# Patient Record
Sex: FEMALE | Race: WHITE | NOT HISPANIC OR LATINO
[De-identification: names, ages, dates, MRNs, and addresses within clinical notes are randomized per-mention and may not be internally consistent; named-entity substitution may affect disease eponyms.]

---

## 2019-01-15 ENCOUNTER — APPOINTMENT (OUTPATIENT)
Dept: INTERNAL MEDICINE | Facility: CLINIC | Age: 49
End: 2019-01-15
Payer: COMMERCIAL

## 2019-01-15 VITALS
BODY MASS INDEX: 36.29 KG/M2 | HEIGHT: 63.75 IN | WEIGHT: 210 LBS | SYSTOLIC BLOOD PRESSURE: 150 MMHG | DIASTOLIC BLOOD PRESSURE: 90 MMHG

## 2019-01-15 DIAGNOSIS — R63.5 ABNORMAL WEIGHT GAIN: ICD-10-CM

## 2019-01-15 DIAGNOSIS — Z78.9 OTHER SPECIFIED HEALTH STATUS: ICD-10-CM

## 2019-01-15 DIAGNOSIS — Z80.0 FAMILY HISTORY OF MALIGNANT NEOPLASM OF DIGESTIVE ORGANS: ICD-10-CM

## 2019-01-15 DIAGNOSIS — Z82.49 FAMILY HISTORY OF ISCHEMIC HEART DISEASE AND OTHER DISEASES OF THE CIRCULATORY SYSTEM: ICD-10-CM

## 2019-01-15 DIAGNOSIS — Z83.3 FAMILY HISTORY OF DIABETES MELLITUS: ICD-10-CM

## 2019-01-15 PROCEDURE — 99214 OFFICE O/P EST MOD 30 MIN: CPT

## 2019-01-15 RX ORDER — ESCITALOPRAM OXALATE 10 MG/1
10 TABLET, FILM COATED ORAL
Refills: 0 | Status: DISCONTINUED | COMMUNITY
End: 2019-01-15

## 2019-01-15 NOTE — HISTORY OF PRESENT ILLNESS
[de-identified] : Patient is a 48 F with HTN (was diet controlled), anxiety, HLD presents today for elevated blood pressure. Has been having fatigue, headaches for a few months. High readings with her own machine, which was in 140s. Her niece who is an EMT  took it at 159/95\par reports weight gain (was 198lbs in 4/2018, now 210lbs) and dietary indiscretions - using more salt. Cooks, rarely goes out for meals.

## 2019-01-15 NOTE — REVIEW OF SYSTEMS
[Fatigue] : fatigue [Recent Change In Weight] : ~T recent weight change [Negative] : Heme/Lymph [Headache] : headache [Dizziness] : no dizziness [Memory Loss] : no memory loss [Unsteady Walking] : no ataxia

## 2019-01-15 NOTE — PHYSICAL EXAM
[No Acute Distress] : no acute distress [Well Nourished] : well nourished [Well Developed] : well developed [Well-Appearing] : well-appearing [Normal Sclera/Conjunctiva] : normal sclera/conjunctiva [PERRL] : pupils equal round and reactive to light [EOMI] : extraocular movements intact [Normal Outer Ear/Nose] : the outer ears and nose were normal in appearance [Normal Oropharynx] : the oropharynx was normal [Supple] : supple [No Lymphadenopathy] : no lymphadenopathy [Thyroid Normal, No Nodules] : the thyroid was normal and there were no nodules present [No Respiratory Distress] : no respiratory distress  [Clear to Auscultation] : lungs were clear to auscultation bilaterally [No Accessory Muscle Use] : no accessory muscle use [Normal Rate] : normal rate  [Regular Rhythm] : with a regular rhythm [Normal S1, S2] : normal S1 and S2 [No Murmur] : no murmur heard [No Carotid Bruits] : no carotid bruits [Pedal Pulses Present] : the pedal pulses are present [No Edema] : there was no peripheral edema [No Extremity Clubbing/Cyanosis] : no extremity clubbing/cyanosis [Soft] : abdomen soft [Non Tender] : non-tender [Non-distended] : non-distended [No Masses] : no abdominal mass palpated [No HSM] : no HSM [Normal Bowel Sounds] : normal bowel sounds [Normal Posterior Cervical Nodes] : no posterior cervical lymphadenopathy [Normal Anterior Cervical Nodes] : no anterior cervical lymphadenopathy [No CVA Tenderness] : no CVA  tenderness [No Spinal Tenderness] : no spinal tenderness [No Joint Swelling] : no joint swelling [Grossly Normal Strength/Tone] : grossly normal strength/tone [No Rash] : no rash [Normal Gait] : normal gait [Coordination Grossly Intact] : coordination grossly intact [No Focal Deficits] : no focal deficits [Normal Affect] : the affect was normal [Normal Insight/Judgement] : insight and judgment were intact

## 2019-01-16 ENCOUNTER — TRANSCRIPTION ENCOUNTER (OUTPATIENT)
Age: 49
End: 2019-01-16

## 2019-02-04 ENCOUNTER — APPOINTMENT (OUTPATIENT)
Dept: INTERNAL MEDICINE | Facility: CLINIC | Age: 49
End: 2019-02-04
Payer: COMMERCIAL

## 2019-02-04 ENCOUNTER — RESULT CHARGE (OUTPATIENT)
Age: 49
End: 2019-02-04

## 2019-02-04 VITALS — SYSTOLIC BLOOD PRESSURE: 130 MMHG | DIASTOLIC BLOOD PRESSURE: 80 MMHG

## 2019-02-04 VITALS
SYSTOLIC BLOOD PRESSURE: 140 MMHG | DIASTOLIC BLOOD PRESSURE: 80 MMHG | WEIGHT: 210 LBS | HEIGHT: 63.75 IN | TEMPERATURE: 97 F | BODY MASS INDEX: 36.29 KG/M2

## 2019-02-04 DIAGNOSIS — R30.0 DYSURIA: ICD-10-CM

## 2019-02-04 PROCEDURE — 99214 OFFICE O/P EST MOD 30 MIN: CPT | Mod: 25

## 2019-02-04 PROCEDURE — 81003 URINALYSIS AUTO W/O SCOPE: CPT | Mod: QW

## 2019-02-04 NOTE — HISTORY OF PRESENT ILLNESS
[FreeTextEntry8] : Patient is a 48F with HTN, presents today with dysuria and frequency since yesterday. There is burning on urination at the end of the stream and darkening of the urine. Symptoms continue today with lower abdominal pain as well. \par There is no fever or chills\par \par Patient with newly diagnosed HTN on Amlodipine since 1/15/19. tolerating medication well. Taking BP at home in 130s and 140s.

## 2019-02-06 LAB — BACTERIA UR CULT: NORMAL

## 2019-02-07 ENCOUNTER — RECORD ABSTRACTING (OUTPATIENT)
Age: 49
End: 2019-02-07

## 2019-02-07 DIAGNOSIS — F41.9 ANXIETY DISORDER, UNSPECIFIED: ICD-10-CM

## 2019-02-07 DIAGNOSIS — Z87.2 PERSONAL HISTORY OF DISEASES OF THE SKIN AND SUBCUTANEOUS TISSUE: ICD-10-CM

## 2019-02-07 DIAGNOSIS — Z87.09 PERSONAL HISTORY OF OTHER DISEASES OF THE RESPIRATORY SYSTEM: ICD-10-CM

## 2019-02-07 DIAGNOSIS — N76.0 ACUTE VAGINITIS: ICD-10-CM

## 2019-02-07 DIAGNOSIS — Z87.891 PERSONAL HISTORY OF NICOTINE DEPENDENCE: ICD-10-CM

## 2019-02-07 DIAGNOSIS — N80.9 ENDOMETRIOSIS, UNSPECIFIED: ICD-10-CM

## 2019-02-07 LAB — CYTOLOGY CVX/VAG DOC THIN PREP: NORMAL

## 2019-02-11 ENCOUNTER — APPOINTMENT (OUTPATIENT)
Dept: INTERNAL MEDICINE | Facility: CLINIC | Age: 49
End: 2019-02-11

## 2019-02-13 ENCOUNTER — RESULT CHARGE (OUTPATIENT)
Age: 49
End: 2019-02-13

## 2019-02-13 ENCOUNTER — OTHER (OUTPATIENT)
Age: 49
End: 2019-02-13

## 2019-02-15 LAB
BILIRUB UR QL STRIP: NORMAL
CLARITY UR: CLEAR
COLLECTION METHOD: NORMAL
GLUCOSE UR-MCNC: NORMAL
HCG UR QL: 0.2 EU/DL
HGB UR QL STRIP.AUTO: NORMAL
KETONES UR-MCNC: NORMAL
LEUKOCYTE ESTERASE UR QL STRIP: NORMAL
NITRITE UR QL STRIP: NEGATIVE
PH UR STRIP: 6
PROT UR STRIP-MCNC: 100
SP GR UR STRIP: 1.02

## 2019-02-16 ENCOUNTER — OTHER (OUTPATIENT)
Age: 49
End: 2019-02-16

## 2019-02-16 LAB — BACTERIA UR CULT: ABNORMAL

## 2019-03-01 ENCOUNTER — RX RENEWAL (OUTPATIENT)
Age: 49
End: 2019-03-01

## 2019-03-15 ENCOUNTER — RX RENEWAL (OUTPATIENT)
Age: 49
End: 2019-03-15

## 2019-03-19 ENCOUNTER — RX RENEWAL (OUTPATIENT)
Age: 49
End: 2019-03-19

## 2019-03-22 ENCOUNTER — APPOINTMENT (OUTPATIENT)
Dept: INTERNAL MEDICINE | Facility: CLINIC | Age: 49
End: 2019-03-22
Payer: COMMERCIAL

## 2019-03-22 VITALS
HEIGHT: 63.75 IN | DIASTOLIC BLOOD PRESSURE: 76 MMHG | BODY MASS INDEX: 36.29 KG/M2 | OXYGEN SATURATION: 98 % | WEIGHT: 210 LBS | HEART RATE: 85 BPM | TEMPERATURE: 98.7 F | SYSTOLIC BLOOD PRESSURE: 121 MMHG

## 2019-03-22 DIAGNOSIS — Z87.09 PERSONAL HISTORY OF OTHER DISEASES OF THE RESPIRATORY SYSTEM: ICD-10-CM

## 2019-03-22 PROCEDURE — 99213 OFFICE O/P EST LOW 20 MIN: CPT | Mod: 25

## 2019-03-22 PROCEDURE — G0444 DEPRESSION SCREEN ANNUAL: CPT

## 2019-03-22 PROCEDURE — G0442 ANNUAL ALCOHOL SCREEN 15 MIN: CPT

## 2019-03-22 RX ORDER — NITROFURANTOIN (MONOHYDRATE/MACROCRYSTALS) 25; 75 MG/1; MG/1
100 CAPSULE ORAL
Qty: 10 | Refills: 0 | Status: COMPLETED | COMMUNITY
Start: 2019-02-04 | End: 2019-03-22

## 2019-03-22 RX ORDER — SULFAMETHOXAZOLE AND TRIMETHOPRIM 800; 160 MG/1; MG/1
800-160 TABLET ORAL TWICE DAILY
Qty: 6 | Refills: 0 | Status: COMPLETED | COMMUNITY
Start: 2019-02-16 | End: 2019-03-22

## 2019-03-22 RX ORDER — METRONIDAZOLE 7.5 MG/G
0.75 GEL VAGINAL
Refills: 0 | Status: COMPLETED | COMMUNITY
End: 2019-03-22

## 2019-03-22 RX ORDER — UBIDECARENONE 30 MG
CAPSULE ORAL
Refills: 0 | Status: ACTIVE | COMMUNITY

## 2019-03-22 NOTE — PHYSICAL EXAM

## 2019-03-22 NOTE — HISTORY OF PRESENT ILLNESS
[FreeTextEntry8] : sore throat  severe.  achey all over.   feeling very fatigued.     no cough.   no nasal congestion.  \par \par

## 2019-03-25 LAB — BACTERIA THROAT CULT: NORMAL

## 2019-04-03 ENCOUNTER — OTHER (OUTPATIENT)
Age: 49
End: 2019-04-03

## 2019-04-10 ENCOUNTER — RX RENEWAL (OUTPATIENT)
Age: 49
End: 2019-04-10

## 2019-05-14 ENCOUNTER — APPOINTMENT (OUTPATIENT)
Dept: OBGYN | Facility: CLINIC | Age: 49
End: 2019-05-14
Payer: COMMERCIAL

## 2019-05-14 VITALS
DIASTOLIC BLOOD PRESSURE: 78 MMHG | HEIGHT: 63 IN | BODY MASS INDEX: 37.21 KG/M2 | SYSTOLIC BLOOD PRESSURE: 122 MMHG | WEIGHT: 210 LBS

## 2019-05-14 DIAGNOSIS — Z12.31 ENCOUNTER FOR SCREENING MAMMOGRAM FOR MALIGNANT NEOPLASM OF BREAST: ICD-10-CM

## 2019-05-14 DIAGNOSIS — R92.2 INCONCLUSIVE MAMMOGRAM: ICD-10-CM

## 2019-05-14 PROCEDURE — 99396 PREV VISIT EST AGE 40-64: CPT

## 2019-05-16 NOTE — HISTORY OF PRESENT ILLNESS
[Post-Menopause, No Sxs] : post-menopausal, currently without symptoms [Sexually Active] : is not sexually active

## 2019-06-03 ENCOUNTER — RX RENEWAL (OUTPATIENT)
Age: 49
End: 2019-06-03

## 2019-06-03 ENCOUNTER — TRANSCRIPTION ENCOUNTER (OUTPATIENT)
Age: 49
End: 2019-06-03

## 2019-06-13 ENCOUNTER — RX RENEWAL (OUTPATIENT)
Age: 49
End: 2019-06-13

## 2019-06-17 LAB
CANDIDA VAG CYTO: NOT DETECTED
CYTOLOGY CVX/VAG DOC THIN PREP: NORMAL
G VAGINALIS+PREV SP MTYP VAG QL MICRO: NOT DETECTED
HPV HIGH+LOW RISK DNA PNL CVX: NOT DETECTED
T VAGINALIS VAG QL WET PREP: NOT DETECTED

## 2019-07-03 ENCOUNTER — RX RENEWAL (OUTPATIENT)
Age: 49
End: 2019-07-03

## 2019-07-29 ENCOUNTER — RESULT REVIEW (OUTPATIENT)
Age: 49
End: 2019-07-29

## 2019-07-31 ENCOUNTER — RX RENEWAL (OUTPATIENT)
Age: 49
End: 2019-07-31

## 2020-02-10 ENCOUNTER — APPOINTMENT (OUTPATIENT)
Dept: INTERNAL MEDICINE | Facility: CLINIC | Age: 50
End: 2020-02-10
Payer: COMMERCIAL

## 2020-02-10 VITALS — HEIGHT: 63 IN | TEMPERATURE: 98 F | BODY MASS INDEX: 37.21 KG/M2 | WEIGHT: 210 LBS

## 2020-02-10 VITALS — DIASTOLIC BLOOD PRESSURE: 90 MMHG | SYSTOLIC BLOOD PRESSURE: 150 MMHG

## 2020-02-10 DIAGNOSIS — Z87.09 PERSONAL HISTORY OF OTHER DISEASES OF THE RESPIRATORY SYSTEM: ICD-10-CM

## 2020-02-10 PROCEDURE — 99213 OFFICE O/P EST LOW 20 MIN: CPT

## 2020-02-10 NOTE — HEALTH RISK ASSESSMENT
[Yes] : Yes [Never (0 pts)] : Never (0 points) [Monthly or less (1 pt)] : Monthly or less (1 point) [1 or 2 (0 pts)] : 1 or 2 (0 points) [No] : In the past 12 months have you used drugs other than those required for medical reasons? No [No falls in past year] : Patient reported no falls in the past year [0] : 2) Feeling down, depressed, or hopeless: Not at all (0) [] : No [YearQuit] : 1999 [de-identified] : rare [de-identified] : No [de-identified] : healthy [ICB1Pkxxx] : 0

## 2020-02-10 NOTE — HISTORY OF PRESENT ILLNESS
[FreeTextEntry8] : Patient is a 49F with HTN presents today feeling sick\par Has been sick on and off since november, in between had UTI. Was flu swabbed at beginning of Jan which was negative\par Has now developed a cough for the last few days. No fever currently

## 2020-02-10 NOTE — PHYSICAL EXAM
[Normal Sclera/Conjunctiva] : normal sclera/conjunctiva [Normal Outer Ear/Nose] : the outer ears and nose were normal in appearance [Normal] : no respiratory distress, lungs were clear to auscultation bilaterally and no accessory muscle use [No Edema] : there was no peripheral edema [de-identified] : swollen nasal turbinates, sinus tenderness

## 2020-02-10 NOTE — REVIEW OF SYSTEMS
[Fatigue] : fatigue [Postnasal Drip] : postnasal drip [Cough] : cough [Chills] : no chills [Fever] : no fever [Nasal Discharge] : no nasal discharge [Sore Throat] : no sore throat

## 2020-05-13 ENCOUNTER — APPOINTMENT (OUTPATIENT)
Dept: INTERNAL MEDICINE | Facility: CLINIC | Age: 50
End: 2020-05-13
Payer: COMMERCIAL

## 2020-05-13 VITALS — WEIGHT: 222 LBS | TEMPERATURE: 98 F | BODY MASS INDEX: 39.34 KG/M2 | HEIGHT: 63 IN

## 2020-05-13 VITALS — SYSTOLIC BLOOD PRESSURE: 140 MMHG | DIASTOLIC BLOOD PRESSURE: 80 MMHG

## 2020-05-13 DIAGNOSIS — R51 HEADACHE: ICD-10-CM

## 2020-05-13 PROCEDURE — 99213 OFFICE O/P EST LOW 20 MIN: CPT

## 2020-05-13 RX ORDER — DOXYCYCLINE 100 MG/1
100 TABLET, FILM COATED ORAL
Qty: 14 | Refills: 0 | Status: COMPLETED | COMMUNITY
Start: 2020-02-10 | End: 2020-05-13

## 2020-05-13 RX ORDER — AZITHROMYCIN 250 MG/1
250 TABLET, FILM COATED ORAL
Qty: 6 | Refills: 0 | Status: COMPLETED | COMMUNITY
Start: 2019-03-22 | End: 2020-05-13

## 2020-05-13 NOTE — HEALTH RISK ASSESSMENT
[Yes] : Yes [2 - 4 times a month (2 pts)] : 2-4 times a month (2 points) [1 or 2 (0 pts)] : 1 or 2 (0 points) [Never (0 pts)] : Never (0 points) [No] : In the past 12 months have you used drugs other than those required for medical reasons? No [No falls in past year] : Patient reported no falls in the past year [0] : 2) Feeling down, depressed, or hopeless: Not at all (0) [] : No [YearQuit] : 1998 [de-identified] : working on it- quarantine diet  [de-identified] : no [HXF1Ubfon] : 0

## 2020-05-13 NOTE — HISTORY OF PRESENT ILLNESS
[FreeTextEntry8] : Patient is a 49F with HTN presents today with headaches and elevated BP readings at home. \par Has been having a headache x 1 week, minimal relief with tylenol and aleve\par BP elevated at home systolic 140s\par Takes amlodipine 7.5mg daily. Was previously on HCTZ but d/c'd since lost weight\par Has gained 12 lbs since last visit

## 2020-05-21 ENCOUNTER — RX RENEWAL (OUTPATIENT)
Age: 50
End: 2020-05-21

## 2020-06-09 ENCOUNTER — APPOINTMENT (OUTPATIENT)
Dept: INTERNAL MEDICINE | Facility: CLINIC | Age: 50
End: 2020-06-09
Payer: COMMERCIAL

## 2020-06-09 VITALS — DIASTOLIC BLOOD PRESSURE: 70 MMHG | SYSTOLIC BLOOD PRESSURE: 118 MMHG

## 2020-06-09 VITALS — TEMPERATURE: 98 F | WEIGHT: 222 LBS | BODY MASS INDEX: 39.34 KG/M2 | HEIGHT: 63 IN

## 2020-06-09 PROCEDURE — G0009: CPT

## 2020-06-09 PROCEDURE — 36415 COLL VENOUS BLD VENIPUNCTURE: CPT

## 2020-06-09 PROCEDURE — 90732 PPSV23 VACC 2 YRS+ SUBQ/IM: CPT

## 2020-06-09 PROCEDURE — 99396 PREV VISIT EST AGE 40-64: CPT | Mod: 25

## 2020-06-09 NOTE — HISTORY OF PRESENT ILLNESS
[FreeTextEntry1] : physical [de-identified] : Patient is a 48 F with HTN (was diet controlled), anxiety, HLD presents today for annual physical\par Since adding HCTZ 25mg Headaches resolved and BP improved

## 2020-06-11 ENCOUNTER — TRANSCRIPTION ENCOUNTER (OUTPATIENT)
Age: 50
End: 2020-06-11

## 2020-06-11 LAB
ALBUMIN SERPL ELPH-MCNC: 4.2 G/DL
ALP BLD-CCNC: 93 U/L
ALT SERPL-CCNC: 30 U/L
ANION GAP SERPL CALC-SCNC: 15 MMOL/L
AST SERPL-CCNC: 31 U/L
BASOPHILS # BLD AUTO: 0.06 K/UL
BASOPHILS NFR BLD AUTO: 0.4 %
BILIRUB SERPL-MCNC: 0.2 MG/DL
BUN SERPL-MCNC: 16 MG/DL
CALCIUM SERPL-MCNC: 9.4 MG/DL
CHLORIDE SERPL-SCNC: 101 MMOL/L
CHOLEST SERPL-MCNC: 221 MG/DL
CHOLEST/HDLC SERPL: 4.4 RATIO
CO2 SERPL-SCNC: 26 MMOL/L
CREAT SERPL-MCNC: 0.58 MG/DL
EOSINOPHIL # BLD AUTO: 0.3 K/UL
EOSINOPHIL NFR BLD AUTO: 2.2 %
ESTIMATED AVERAGE GLUCOSE: 111 MG/DL
FERRITIN SERPL-MCNC: 329 NG/ML
FOLATE SERPL-MCNC: 8.4 NG/ML
GLUCOSE SERPL-MCNC: 88 MG/DL
HBA1C MFR BLD HPLC: 5.5 %
HCT VFR BLD CALC: 44.1 %
HDLC SERPL-MCNC: 51 MG/DL
HGB BLD-MCNC: 13.2 G/DL
IMM GRANULOCYTES NFR BLD AUTO: 0.7 %
IRON SATN MFR SERPL: 28 %
IRON SERPL-MCNC: 107 UG/DL
LDLC SERPL CALC-MCNC: 103 MG/DL
LYMPHOCYTES # BLD AUTO: 2.83 K/UL
LYMPHOCYTES NFR BLD AUTO: 21 %
MAN DIFF?: NORMAL
MCHC RBC-ENTMCNC: 27.8 PG
MCHC RBC-ENTMCNC: 29.9 GM/DL
MCV RBC AUTO: 93 FL
MONOCYTES # BLD AUTO: 0.64 K/UL
MONOCYTES NFR BLD AUTO: 4.8 %
NEUTROPHILS # BLD AUTO: 9.55 K/UL
NEUTROPHILS NFR BLD AUTO: 70.9 %
PLATELET # BLD AUTO: 331 K/UL
POTASSIUM SERPL-SCNC: 3.9 MMOL/L
PROT SERPL-MCNC: 7.3 G/DL
RBC # BLD: 4.74 M/UL
RBC # FLD: 14.6 %
SODIUM SERPL-SCNC: 142 MMOL/L
TIBC SERPL-MCNC: 383 UG/DL
TRIGL SERPL-MCNC: 338 MG/DL
TSH SERPL-ACNC: 1.29 UIU/ML
UIBC SERPL-MCNC: 276 UG/DL
VIT B12 SERPL-MCNC: 468 PG/ML
WBC # FLD AUTO: 13.47 K/UL

## 2020-07-16 ENCOUNTER — RX RENEWAL (OUTPATIENT)
Age: 50
End: 2020-07-16

## 2020-09-22 ENCOUNTER — RESULT REVIEW (OUTPATIENT)
Age: 50
End: 2020-09-22

## 2020-10-09 ENCOUNTER — APPOINTMENT (OUTPATIENT)
Dept: OBGYN | Facility: CLINIC | Age: 50
End: 2020-10-09
Payer: COMMERCIAL

## 2020-10-09 DIAGNOSIS — Z01.419 ENCOUNTER FOR GYNECOLOGICAL EXAMINATION (GENERAL) (ROUTINE) W/OUT ABNORMAL FINDINGS: ICD-10-CM

## 2020-10-09 DIAGNOSIS — Z11.51 ENCOUNTER FOR SCREENING FOR HUMAN PAPILLOMAVIRUS (HPV): ICD-10-CM

## 2020-10-09 PROCEDURE — 99396 PREV VISIT EST AGE 40-64: CPT

## 2020-10-13 LAB — HPV HIGH+LOW RISK DNA PNL CVX: NOT DETECTED

## 2020-10-14 PROBLEM — Z11.51 SCREENING FOR HPV (HUMAN PAPILLOMAVIRUS): Status: RESOLVED | Noted: 2019-05-14 | Resolved: 2020-10-14

## 2020-10-14 PROBLEM — Z01.419 ENCOUNTER FOR ANNUAL ROUTINE GYNECOLOGICAL EXAMINATION: Status: RESOLVED | Noted: 2019-05-14 | Resolved: 2020-10-14

## 2020-10-14 NOTE — HISTORY OF PRESENT ILLNESS
[FreeTextEntry1] : 50 y o female presents for routine annual GYN care. Her last annual GYN visit was in 5/2019 and pap smear then was benign HPV negative. \par She states she is well and overall in good health. She denies current GYN complaints and reports normal bowel and bladder function. She is sexually active in a stable monogamous relationship and has no concerns. She reports monthly menses which are normal in flow and duration. \par Breast imaging in 9/2020 was benign BI-RADS 1\par Colonoscopy in 2017 was normal

## 2020-10-19 LAB — CYTOLOGY CVX/VAG DOC THIN PREP: ABNORMAL

## 2020-12-21 PROBLEM — Z87.09 HISTORY OF ACUTE PHARYNGITIS: Status: RESOLVED | Noted: 2019-03-22 | Resolved: 2020-12-21

## 2020-12-21 PROBLEM — Z12.31 ENCOUNTER FOR SCREENING MAMMOGRAM FOR BREAST CANCER: Status: RESOLVED | Noted: 2019-05-14 | Resolved: 2020-12-21

## 2020-12-21 PROBLEM — N76.0 ACUTE VAGINITIS: Status: RESOLVED | Noted: 2019-02-07 | Resolved: 2020-12-21

## 2020-12-23 PROBLEM — Z87.09 HISTORY OF ACUTE SINUSITIS: Status: RESOLVED | Noted: 2020-02-10 | Resolved: 2020-12-23

## 2021-01-10 ENCOUNTER — RX RENEWAL (OUTPATIENT)
Age: 51
End: 2021-01-10

## 2021-07-26 ENCOUNTER — RX RENEWAL (OUTPATIENT)
Age: 51
End: 2021-07-26

## 2021-10-11 ENCOUNTER — APPOINTMENT (OUTPATIENT)
Dept: OBGYN | Facility: CLINIC | Age: 51
End: 2021-10-11
Payer: COMMERCIAL

## 2021-10-11 VITALS
DIASTOLIC BLOOD PRESSURE: 80 MMHG | BODY MASS INDEX: 38.09 KG/M2 | SYSTOLIC BLOOD PRESSURE: 142 MMHG | WEIGHT: 215 LBS | HEIGHT: 63 IN

## 2021-10-11 PROCEDURE — 99396 PREV VISIT EST AGE 40-64: CPT

## 2021-10-11 NOTE — HISTORY OF PRESENT ILLNESS
[TextBox_4] : 50 year old pt for annual exam. No complaints today. Using continuous OCPs for control of endometriosis and satisfied with method. Not currently sexually active; in the midst of a divorce. No history of cervical dysplasia. Up to date with colon ca screening, due for mammogram. Works for the [employer].

## 2021-10-11 NOTE — PLAN
[FreeTextEntry1] : OCPs renewed, rx mammogram given. Cervix ca screening due 2025. Return in one year or sooner PRN.

## 2021-10-11 NOTE — PHYSICAL EXAM
[Appropriately responsive] : appropriately responsive [Alert] : alert [No Acute Distress] : no acute distress [No Lymphadenopathy] : no lymphadenopathy [Soft] : soft [Non-tender] : non-tender [Non-distended] : non-distended [No HSM] : No HSM [No Lesions] : no lesions [Oriented x3] : oriented x3 [No Mass] : no mass [Examination Of The Breasts] : a normal appearance [No Masses] : no breast masses were palpable [Labia Majora] : normal [Labia Minora] : normal [Normal] : normal [Uterine Adnexae] : normal

## 2021-11-15 ENCOUNTER — NON-APPOINTMENT (OUTPATIENT)
Age: 51
End: 2021-11-15

## 2021-11-17 ENCOUNTER — APPOINTMENT (OUTPATIENT)
Dept: INTERNAL MEDICINE | Facility: CLINIC | Age: 51
End: 2021-11-17
Payer: COMMERCIAL

## 2021-11-17 VITALS — SYSTOLIC BLOOD PRESSURE: 138 MMHG | DIASTOLIC BLOOD PRESSURE: 80 MMHG

## 2021-11-17 VITALS — BODY MASS INDEX: 37.21 KG/M2 | TEMPERATURE: 98 F | WEIGHT: 210 LBS | HEIGHT: 63 IN

## 2021-11-17 DIAGNOSIS — R53.83 OTHER FATIGUE: ICD-10-CM

## 2021-11-17 PROCEDURE — 99214 OFFICE O/P EST MOD 30 MIN: CPT | Mod: 25

## 2021-11-17 PROCEDURE — 90686 IIV4 VACC NO PRSV 0.5 ML IM: CPT

## 2021-11-17 PROCEDURE — 99396 PREV VISIT EST AGE 40-64: CPT | Mod: 25

## 2021-11-17 PROCEDURE — 36415 COLL VENOUS BLD VENIPUNCTURE: CPT

## 2021-11-17 PROCEDURE — 90471 IMMUNIZATION ADMIN: CPT

## 2021-11-17 NOTE — HEALTH RISK ASSESSMENT
[Yes] : Yes [2 - 4 times a month (2 pts)] : 2-4 times a month (2 points) [1 or 2 (0 pts)] : 1 or 2 (0 points) [Never (0 pts)] : Never (0 points) [No] : In the past 12 months have you used drugs other than those required for medical reasons? No [No falls in past year] : Patient reported no falls in the past year [0] : 2) Feeling down, depressed, or hopeless: Not at all (0) [PHQ-2 Negative - No further assessment needed] : PHQ-2 Negative - No further assessment needed [Patient reported mammogram was normal] : Patient reported mammogram was normal [Patient reported colonoscopy was abnormal] : Patient reported colonoscopy was abnormal [HIV test declined] : HIV test declined [Hepatitis C test declined] : Hepatitis C test declined [With Family] : lives with family [# of Members in Household ___] :  household currently consist of [unfilled] member(s) [Employed] : employed [College] : College [# Of Children ___] : has [unfilled] children [Feels Safe at Home] : Feels safe at home [Reports changes in vision] : Reports changes in vision [Smoke Detector] : smoke detector [Carbon Monoxide Detector] : carbon monoxide detector [Seat Belt] :  uses seat belt [Sunscreen] : uses sunscreen [With Patient/Caregiver] : , with patient/caregiver [Patient reported PAP Smear was normal] : Patient reported PAP Smear was normal [] : No [YearQuit] : 1999 [de-identified] : socially  [Audit-CScore] : 2 [de-identified] : no [de-identified] : normal  [[id number]] : 0 [Reports changes in hearing] : Reports no changes in hearing [Reports normal functional visual acuity (ie: able to read med bottle)] : Reports poor functional visual acuity.  [Reports changes in dental health] : Reports no changes in dental health [Guns at Home] : no guns at home [Safety elements used in home] : no safety elements used in home [Travel to Developing Areas] : does not  travel to developing areas [Caregiver Concerns] : does not have caregiver concerns [MammogramDate] : 9/2020 [PapSmearDate] : 10/2020 [PapSmearComments] : green [BoneDensityDate] : never [ColonoscopyDate] : 12/17 [ColonoscopyComments] : father colon cancer, Ophelia [FreeTextEntry2] : Works for [employer] [FreeTextEntry3] :   [de-identified] : new glasses, last exam 2021 [facility]  [de-identified] : last exam 7/2021 [AdvancecareDate] : 11/2021

## 2021-11-17 NOTE — HISTORY OF PRESENT ILLNESS
[FreeTextEntry1] : physical [de-identified] : Patient is a 48 F with HTN, anxiety, HLD presents today for annual physical\par BP elevated high 130s systolic at home \par Always tired, even after sleeping 8 hours, can nod off if reading\par mom- hx of thyroid nodules

## 2021-11-17 NOTE — PHYSICAL EXAM
[No Acute Distress] : no acute distress [Well Nourished] : well nourished [Well Developed] : well developed [Well-Appearing] : well-appearing [Normal Sclera/Conjunctiva] : normal sclera/conjunctiva [PERRL] : pupils equal round and reactive to light [EOMI] : extraocular movements intact [Normal Outer Ear/Nose] : the outer ears and nose were normal in appearance [Normal Oropharynx] : the oropharynx was normal [No Lymphadenopathy] : no lymphadenopathy [Supple] : supple [Thyroid Normal, No Nodules] : the thyroid was normal and there were no nodules present [No Respiratory Distress] : no respiratory distress  [No Accessory Muscle Use] : no accessory muscle use [Clear to Auscultation] : lungs were clear to auscultation bilaterally [Normal Rate] : normal rate  [Regular Rhythm] : with a regular rhythm [Normal S1, S2] : normal S1 and S2 [No Murmur] : no murmur heard [No Carotid Bruits] : no carotid bruits [No Abdominal Bruit] : a ~M bruit was not heard ~T in the abdomen [No Varicosities] : no varicosities [Pedal Pulses Present] : the pedal pulses are present [No Edema] : there was no peripheral edema [No Palpable Aorta] : no palpable aorta [No Extremity Clubbing/Cyanosis] : no extremity clubbing/cyanosis [Soft] : abdomen soft [Non Tender] : non-tender [Non-distended] : non-distended [No Masses] : no abdominal mass palpated [No HSM] : no HSM [Normal Bowel Sounds] : normal bowel sounds [Normal Posterior Cervical Nodes] : no posterior cervical lymphadenopathy [Normal Anterior Cervical Nodes] : no anterior cervical lymphadenopathy [No CVA Tenderness] : no CVA  tenderness [No Spinal Tenderness] : no spinal tenderness [No Joint Swelling] : no joint swelling [Grossly Normal Strength/Tone] : grossly normal strength/tone [No Rash] : no rash [Coordination Grossly Intact] : coordination grossly intact [No Focal Deficits] : no focal deficits [Normal Gait] : normal gait [Deep Tendon Reflexes (DTR)] : deep tendon reflexes were 2+ and symmetric [Normal Affect] : the affect was normal [Normal Insight/Judgement] : insight and judgment were intact

## 2021-11-17 NOTE — ASSESSMENT
[FreeTextEntry1] : due for mammo, has appt in Jan 2022\par Due for colonoscopy 2022\par Up to date with vaccinations, flu shot given today

## 2021-11-20 ENCOUNTER — TRANSCRIPTION ENCOUNTER (OUTPATIENT)
Age: 51
End: 2021-11-20

## 2021-11-20 LAB
25(OH)D3 SERPL-MCNC: 23.1 NG/ML
ALBUMIN SERPL ELPH-MCNC: 4.3 G/DL
ALP BLD-CCNC: 97 U/L
ALT SERPL-CCNC: 15 U/L
ANION GAP SERPL CALC-SCNC: 17 MMOL/L
AST SERPL-CCNC: 19 U/L
BASOPHILS # BLD AUTO: 0.07 K/UL
BASOPHILS NFR BLD AUTO: 0.5 %
BILIRUB SERPL-MCNC: 0.2 MG/DL
BUN SERPL-MCNC: 18 MG/DL
CALCIUM SERPL-MCNC: 9.1 MG/DL
CHLORIDE SERPL-SCNC: 100 MMOL/L
CHOLEST SERPL-MCNC: 237 MG/DL
CO2 SERPL-SCNC: 21 MMOL/L
CREAT SERPL-MCNC: 0.64 MG/DL
EOSINOPHIL # BLD AUTO: 0.35 K/UL
EOSINOPHIL NFR BLD AUTO: 2.6 %
ESTIMATED AVERAGE GLUCOSE: 117 MG/DL
FERRITIN SERPL-MCNC: 266 NG/ML
FOLATE SERPL-MCNC: 13.3 NG/ML
GLUCOSE SERPL-MCNC: 95 MG/DL
HBA1C MFR BLD HPLC: 5.7 %
HCT VFR BLD CALC: 39.9 %
HDLC SERPL-MCNC: 51 MG/DL
HGB BLD-MCNC: 13 G/DL
IMM GRANULOCYTES NFR BLD AUTO: 0.4 %
IRON SATN MFR SERPL: 22 %
IRON SERPL-MCNC: 91 UG/DL
LDLC SERPL CALC-MCNC: 137 MG/DL
LYMPHOCYTES # BLD AUTO: 3.24 K/UL
LYMPHOCYTES NFR BLD AUTO: 24.3 %
MAN DIFF?: NORMAL
MCHC RBC-ENTMCNC: 27.8 PG
MCHC RBC-ENTMCNC: 32.6 GM/DL
MCV RBC AUTO: 85.3 FL
MONOCYTES # BLD AUTO: 0.76 K/UL
MONOCYTES NFR BLD AUTO: 5.7 %
NEUTROPHILS # BLD AUTO: 8.87 K/UL
NEUTROPHILS NFR BLD AUTO: 66.5 %
NONHDLC SERPL-MCNC: 186 MG/DL
PLATELET # BLD AUTO: 355 K/UL
POTASSIUM SERPL-SCNC: 3.6 MMOL/L
PROT SERPL-MCNC: 7.1 G/DL
RBC # BLD: 4.68 M/UL
RBC # FLD: 13.7 %
SODIUM SERPL-SCNC: 138 MMOL/L
T4 FREE SERPL-MCNC: 1 NG/DL
THYROGLOB AB SERPL-ACNC: <20 IU/ML
THYROPEROXIDASE AB SERPL IA-ACNC: <10 IU/ML
TIBC SERPL-MCNC: 412 UG/DL
TRIGL SERPL-MCNC: 242 MG/DL
TSH SERPL-ACNC: 1.51 UIU/ML
UIBC SERPL-MCNC: 321 UG/DL
VIT B12 SERPL-MCNC: 463 PG/ML
WBC # FLD AUTO: 13.34 K/UL

## 2021-11-29 ENCOUNTER — APPOINTMENT (OUTPATIENT)
Dept: FAMILY MEDICINE | Facility: CLINIC | Age: 51
End: 2021-11-29
Payer: COMMERCIAL

## 2021-11-29 VITALS
TEMPERATURE: 98.9 F | WEIGHT: 210 LBS | OXYGEN SATURATION: 97 % | HEIGHT: 63 IN | SYSTOLIC BLOOD PRESSURE: 100 MMHG | DIASTOLIC BLOOD PRESSURE: 60 MMHG | HEART RATE: 82 BPM | BODY MASS INDEX: 37.21 KG/M2

## 2021-11-29 DIAGNOSIS — Z11.51 ENCOUNTER FOR SCREENING FOR HUMAN PAPILLOMAVIRUS (HPV): ICD-10-CM

## 2021-11-29 DIAGNOSIS — Z92.89 PERSONAL HISTORY OF OTHER MEDICAL TREATMENT: ICD-10-CM

## 2021-11-29 DIAGNOSIS — J06.9 ACUTE UPPER RESPIRATORY INFECTION, UNSPECIFIED: ICD-10-CM

## 2021-11-29 DIAGNOSIS — R92.2 INCONCLUSIVE MAMMOGRAM: ICD-10-CM

## 2021-11-29 LAB
FLUAV SPEC QL CULT: NORMAL
FLUBV AG SPEC QL IA: NORMAL

## 2021-11-29 PROCEDURE — 99213 OFFICE O/P EST LOW 20 MIN: CPT | Mod: 25

## 2021-11-29 PROCEDURE — 87804 INFLUENZA ASSAY W/OPTIC: CPT | Mod: QW

## 2021-11-29 NOTE — HISTORY OF PRESENT ILLNESS
[FreeTextEntry8] : Pt is a 50 y/o F that presents to clinic c/o nasal congestion, cough and body aches for the past few days. She had one elevated temperature but it resolved with Tylenol. Covid test is negative. Would like to check for Flu. Did not take anything over the counter because she has high blood pressure.

## 2021-11-29 NOTE — HEALTH RISK ASSESSMENT
[No] : No [No falls in past year] : Patient reported no falls in the past year [0] : 2) Feeling down, depressed, or hopeless: Not at all (0) [] : No [[id number]] : 0

## 2021-12-16 ENCOUNTER — APPOINTMENT (OUTPATIENT)
Dept: HEMATOLOGY ONCOLOGY | Facility: CLINIC | Age: 51
End: 2021-12-16
Payer: COMMERCIAL

## 2021-12-16 ENCOUNTER — RESULT REVIEW (OUTPATIENT)
Age: 51
End: 2021-12-16

## 2021-12-16 VITALS
OXYGEN SATURATION: 97 % | HEIGHT: 62.99 IN | BODY MASS INDEX: 37.9 KG/M2 | RESPIRATION RATE: 16 BRPM | TEMPERATURE: 98.3 F | SYSTOLIC BLOOD PRESSURE: 125 MMHG | HEART RATE: 84 BPM | WEIGHT: 213.9 LBS | DIASTOLIC BLOOD PRESSURE: 67 MMHG

## 2021-12-16 DIAGNOSIS — R79.89 OTHER SPECIFIED ABNORMAL FINDINGS OF BLOOD CHEMISTRY: ICD-10-CM

## 2021-12-16 PROCEDURE — 99205 OFFICE O/P NEW HI 60 MIN: CPT

## 2021-12-16 PROCEDURE — 36415 COLL VENOUS BLD VENIPUNCTURE: CPT

## 2021-12-16 NOTE — HISTORY OF PRESENT ILLNESS
[de-identified] : Ms. Milagros Sinclair is 51 year old female with leukocytosis and neutrophils here for consultation, referred by Dr. Toya Ochoa.\par \par Patient with past medical history of HTN, anxiety, HLD, endometriosis on birth control pills (Junel)  who had annual visit with Dr. Ochoa and labs dated on \par 11/20/21 wbc 13.34 anc 8.87  Ferritin 266 iron sat 22 % \par 6/2020 wbc 13.47 anc 9.55  Ferritin 329\par \par Pfizer # 2 in April 2021. \par FHx: Father with colon cancer at in his early 60s\par Shx:  smoked 10 yrs, quit when she's 28, social drinker \par Screenings: Dec 2020 - next Jan 2022 \par 3 colonoscopies 2/2 family history - last Dec  2017 \par \par Patient with chronic fatigue, had covid in Jan 2021 and exercise induced asthma. \par \par

## 2021-12-16 NOTE — REASON FOR VISIT
[Initial Consultation] : an initial consultation for [FreeTextEntry2] : leukocytosis with elevated neutrophils.

## 2021-12-16 NOTE — CONSULT LETTER
[Dear  ___] : Dear  [unfilled], [Consult Letter:] : I had the pleasure of evaluating your patient, [unfilled]. [Please see my note below.] : Please see my note below. [Sincerely,] : Sincerely, [FreeTextEntry3] : Asad Pyle MD, MPH\par  of Medicine [facility] [employer] at [facility]\par Attending Physician \par Hematology and Medical Oncology\par [facility]\par

## 2021-12-16 NOTE — ASSESSMENT
[FreeTextEntry1] : Leucocytosis (neutrophilia)\par Tired and fatigued\par Night sweats for the past 1 year but does think it could be related to her menopause\par No fevers, weight loss\par Denies any aches and pains\par Denies any cough, cold, diarrhea\par Smoked 1ppd x 10 years. 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[employer] \par Discussed at length about the differential diagnosis including stress, exercise, infection, inflammation, malignancy, smoking, obesity\par She Reports extreme stress for the past few years and she is in the process of divorce\par Send blood work including ESR, CRP\par Will consider CT C/A/P if persistently elevated counts\par \par Elevated ferritin\par Denies any MVI, OTC medications\par Eats meat almost every night\par Social alcohol intake\par Discussed at length about differential diagnosis including stress infection, inflammation, hemochromatosis\par Send blood work including iron studies, hemochromatosis gene study\par \par Family ho cancer\par Father - colon cancer in 60s\par \par Follow up in 2-3 weeks\par No labs

## 2022-01-06 ENCOUNTER — APPOINTMENT (OUTPATIENT)
Dept: HEMATOLOGY ONCOLOGY | Facility: CLINIC | Age: 52
End: 2022-01-06
Payer: COMMERCIAL

## 2022-01-06 DIAGNOSIS — R79.82 ELEVATED C-REACTIVE PROTEIN (CRP): ICD-10-CM

## 2022-01-06 DIAGNOSIS — R70.0 ELEVATED ERYTHROCYTE SEDIMENTATION RATE: ICD-10-CM

## 2022-01-06 PROCEDURE — 99214 OFFICE O/P EST MOD 30 MIN: CPT | Mod: 95

## 2022-01-06 NOTE — HISTORY OF PRESENT ILLNESS
[Home] : at home, [unfilled] , at the time of the visit. [Medical Office: ([facility])___] : at the medical office located in  [de-identified] : Ms. Milagros Sinclair is 51 year old female with leukocytosis and neutrophils here for consultation, referred by Dr. Toya Ochoa.\par \par Patient with past medical history of HTN, anxiety, HLD, endometriosis on birth control pills (Junel)  who had annual visit with Dr. Ochoa and labs dated on \par 11/20/21 wbc 13.34 anc 8.87  Ferritin 266 iron sat 22 % \par 6/2020 wbc 13.47 anc 9.55  Ferritin 329\par \par Pfizer # 2 in April 2021. \par FHx: Father with colon cancer at in his early 60s\par Shx:  smoked 10 yrs, quit when she's 28, social drinker \par Screenings: Dec 2020 - next Jan 2022 \par 3 colonoscopies 2/2 family history - last Dec  2017 \par \par Patient with chronic fatigue, had covid in Jan 2021 and exercise induced asthma. \par \par  [de-identified] : Telemedicine (video, audio) done today for follow up\par Consent obtained from the patient\par \par She feels good overall\par Her children visited her for the holidays and she feels less stressed overall\par

## 2022-01-06 NOTE — REASON FOR VISIT
[Follow-Up Visit] : a follow-up visit for [FreeTextEntry2] : leukocytosis with elevated neutrophils.

## 2022-01-06 NOTE — ASSESSMENT
[FreeTextEntry1] : Leucocytosis (neutrophilia)\par Tired and fatigued\par Night sweats for the past 1 year but does think it could be related to her menopause\par No fevers, weight loss\par Denies any aches and pains\par Denies any cough, cold, diarrhea\par Smoked 1ppd x 10 years. 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 Also found ot have hemochromatosis\par Encouraged to be uptodate with cancer screening. \par Scheduled mammogram next week\par Imaging PRN\par \par Hemochromatosis\par Homozygous H63D mutation\par Discussed at length about the findings, implications, treatment\par Advised to have her blood relatives tested\par Information given to the patient in the form of [email or web address] website and reading material\par Discussed about the low iron diet and treatment options including periodic phlebotomy, iron chelator\par Liver biopsy is the best test to assess iron content and to rule out liver damage including cirrhosis\par Other option is MRI liver, which can estimate iron content, but is not as sensitive to look for cirrhosis\par Plan\par 1) Schedule MRI abdomen with and without contrast\par 2) Blood donation at [facility] 500 cc x 1 \par \par Family ho cancer\par Father - colon cancer in 60s\par \par Follow up in 4 weeks\par CBC, CMP, iron studies, ferritin, GGT, AFP, ESR, CRP

## 2022-01-06 NOTE — CONSULT LETTER
[Courtesy Letter:] : I had the pleasure of seeing your patient, [unfilled], in my office today. [FreeTextEntry3] : Asad Pyle MD, MPH\par  of Medicine [facility] [employer] at [facility]\par Attending Physician \par Hematology and Medical Oncology\par [facility]\par

## 2022-01-12 DIAGNOSIS — Z91.041 RADIOGRAPHIC DYE ALLERGY STATUS: ICD-10-CM

## 2022-01-24 ENCOUNTER — RESULT REVIEW (OUTPATIENT)
Age: 52
End: 2022-01-24

## 2022-02-01 ENCOUNTER — RESULT REVIEW (OUTPATIENT)
Age: 52
End: 2022-02-01

## 2022-02-01 ENCOUNTER — APPOINTMENT (OUTPATIENT)
Dept: HEMATOLOGY ONCOLOGY | Facility: CLINIC | Age: 52
End: 2022-02-01
Payer: COMMERCIAL

## 2022-02-01 VITALS
WEIGHT: 209.4 LBS | TEMPERATURE: 98.3 F | DIASTOLIC BLOOD PRESSURE: 72 MMHG | SYSTOLIC BLOOD PRESSURE: 120 MMHG | HEART RATE: 73 BPM | HEIGHT: 62.99 IN | RESPIRATION RATE: 16 BRPM | BODY MASS INDEX: 37.1 KG/M2 | OXYGEN SATURATION: 99 %

## 2022-02-01 DIAGNOSIS — Z14.8 GENETIC CARRIER OF OTHER DISEASE: ICD-10-CM

## 2022-02-01 PROCEDURE — 36415 COLL VENOUS BLD VENIPUNCTURE: CPT

## 2022-02-01 PROCEDURE — 99214 OFFICE O/P EST MOD 30 MIN: CPT | Mod: 25

## 2022-02-01 NOTE — CONSULT LETTER
[Dear  ___] : Dear  [unfilled], [Courtesy Letter:] : I had the pleasure of seeing your patient, [unfilled], in my office today. [Please see my note below.] : Please see my note below. [Sincerely,] : Sincerely, [FreeTextEntry3] : Asad Pyle MD, MPH\par  of Medicine [facility] [employer] at [facility]\par Attending Physician \par Hematology and Medical Oncology\par [facility]\par

## 2022-02-01 NOTE — HISTORY OF PRESENT ILLNESS
[Home] : at home, [unfilled] , at the time of the visit. [Medical Office: ([facility])___] : at the medical office located in  [de-identified] : Ms. Milagros Sinclair is 51 year old female with leukocytosis and neutrophils here for consultation, referred by Dr. Toya Ochoa.\par \par Patient with past medical history of HTN, anxiety, HLD, endometriosis on birth control pills (Junel)  who had annual visit with Dr. Ochoa and labs dated on \par 11/20/21 wbc 13.34 anc 8.87  Ferritin 266 iron sat 22 % \par 6/2020 wbc 13.47 anc 9.55  Ferritin 329\par \par Pfizer # 2 in April 2021. \par FHx: Father with colon cancer at in his early 60s\par Shx:  smoked 10 yrs, quit when she's 28, social drinker \par Screenings: Dec 2020 - next Jan 2022 \par 3 colonoscopies 2/2 family history - last Dec  2017 \par \par Patient with chronic fatigue, had covid in Jan 2021 and exercise induced asthma. \par \par  [de-identified] : Patient is seen today for follow up\par \par She has a new cook book for low iron diet\par She likes the diet and has lost weight\par SHe has shared the information with her blood relatives\par She donated blood x 1 -\par \par She is not sleeping as much as before. but she is unsure if it is from diet or blood donation\par \par She is premenopausal, but she does not have menses as she is on birth control pills for endometriosis\par \par

## 2022-02-01 NOTE — ASSESSMENT
[FreeTextEntry1] : Leucocytosis (neutrophilia)\par Tired and fatigued\par Night sweats for the past 1 year but does think it could be related to her menopause\par No fevers, weight loss\par Denies any aches and pains\par Denies any cough, cold, diarrhea\par Smoked 1ppd x 10 years. 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 Also found ot have hemochromatosis\par Encouraged to be uptodate with cancer screening. \par Had to rescheduled mammogram to 2/16/22 as her daughter tested positive for COVID on the day of her original mammogram\par \par Hemochromatosis\par Homozygous H63D mutation\par MRI abd (1/2022) - No iron deposition. Mild steatosis\par s/p Blood donation at [facility]. 500 cc x 1. felt better\par Also following a low iron healthy diet\par Discussed that she may have a subclinical hemochromatosis vs normal MRI due to early in the course of disease (as she is still premenopausal, however with minimal to no menses on OCP)\par Iron studies sent today\par Patient will call to discuss findings/results in a few days\par Repeat in 3-4 months \par \par Family ho cancer\par Father - colon cancer in 60s\par \par Labs in 3-4 months\par CBC, CMP, iron studies, ferritin, GGT, AFP, ESR, CRP\par OV few days later

## 2022-02-17 ENCOUNTER — RESULT REVIEW (OUTPATIENT)
Age: 52
End: 2022-02-17

## 2022-02-22 ENCOUNTER — RX RENEWAL (OUTPATIENT)
Age: 52
End: 2022-02-22

## 2022-06-07 ENCOUNTER — RESULT REVIEW (OUTPATIENT)
Age: 52
End: 2022-06-07

## 2022-06-07 ENCOUNTER — APPOINTMENT (OUTPATIENT)
Dept: HEMATOLOGY ONCOLOGY | Facility: CLINIC | Age: 52
End: 2022-06-07

## 2022-06-07 VITALS
SYSTOLIC BLOOD PRESSURE: 138 MMHG | DIASTOLIC BLOOD PRESSURE: 78 MMHG | WEIGHT: 215 LBS | OXYGEN SATURATION: 97 % | TEMPERATURE: 97.7 F | HEART RATE: 86 BPM | BODY MASS INDEX: 38.09 KG/M2 | HEIGHT: 62.99 IN | RESPIRATION RATE: 16 BRPM

## 2022-06-14 ENCOUNTER — APPOINTMENT (OUTPATIENT)
Dept: HEMATOLOGY ONCOLOGY | Facility: CLINIC | Age: 52
End: 2022-06-14
Payer: COMMERCIAL

## 2022-06-14 VITALS
HEART RATE: 72 BPM | RESPIRATION RATE: 16 BRPM | TEMPERATURE: 97.6 F | OXYGEN SATURATION: 98 % | BODY MASS INDEX: 38.45 KG/M2 | SYSTOLIC BLOOD PRESSURE: 116 MMHG | WEIGHT: 217 LBS | DIASTOLIC BLOOD PRESSURE: 64 MMHG | HEIGHT: 62.99 IN

## 2022-06-14 PROCEDURE — 99214 OFFICE O/P EST MOD 30 MIN: CPT | Mod: 25

## 2022-06-14 NOTE — HISTORY OF PRESENT ILLNESS
[de-identified] : Ms. Milagros Sinclair is 51 year old female with leukocytosis and neutrophils here for consultation, referred by Dr. Toya Ochoa.\par \par Patient with past medical history of HTN, anxiety, HLD, endometriosis on birth control pills (Junel)  who had annual visit with Dr. Ochoa and labs dated on \par 11/20/21 wbc 13.34 anc 8.87  Ferritin 266 iron sat 22 % \par 6/2020 wbc 13.47 anc 9.55  Ferritin 329\par \par Pfizer # 2 in April 2021. \par FHx: Father with colon cancer at in his early 60s\par Shx:  smoked 10 yrs, quit when she's 28, social drinker \par Screenings: Dec 2020 - next Jan 2022 \par 3 colonoscopies 2/2 family history - last Dec  2017 \par \par Patient with chronic fatigue, had covid in Jan 2021 and exercise induced asthma. \par \par  [de-identified] : Patient is seen today for follow up and review blood work from last week\par \par She feels good overall\par Has been donating blood every 6 weeks - total of 3 times since\par Her mother and sister are carrier\par \par She is premenopausal, but she does not have menses as she is on birth control pills for endometriosis\par \par

## 2022-06-14 NOTE — CONSULT LETTER
[Dear  ___] : Dear  [unfilled], [Courtesy Letter:] : I had the pleasure of seeing your patient, [unfilled], in my office today. [Please see my note below.] : Please see my note below. [Sincerely,] : Sincerely, [FreeTextEntry3] : Asad Pyle MD, MPH\par  of Medicine [facility] [employer] at [facility]\par Attending Physician \par Hematology and Medical Oncology\par [facility]\par

## 2022-06-14 NOTE — ASSESSMENT
[FreeTextEntry1] : Hemochromatosis\par Homozygous H63D mutation\par MRI abd (1/2022) - No iron deposition. Mild steatosis\par s/p Blood donation [place] [facility]. 500 cc x 3 so far\par Feels good\par Also following a low iron healthy diet\par Discussed that she may have a subclinical hemochromatosis vs normal MRI due to early in the course of disease (as she is still premenopausal, however with minimal to no menses on OCP)\par Her mother and sister are carriers\par Labs reviewed analysed nad discussed\par Ferritin declined to 60\par Advised to go easy on phlebotomies and may be do it every 3 months\par Continue with diet\par \par Leucocytosis (neutrophilia)\par Tired and fatigued\par Night sweats for the past 1 year but does think it could be related to her menopause\par No fevers, weight loss\par Denies any aches and pains\par Denies any cough, cold, diarrhea\par Smoked 1ppd x 10 years. 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 Also found ot have hemochromatosis\par Encouraged to be uptodate with cancer screening. \par Most recent WBC normal with improvement in ESR\par \par Family ho cancer\par Father - colon cancer in 60s\par \par Labs in 6 months or sooner if needed\par CBC, CMP, iron studies, ferritin, GGT, ESR, CRP\par OV few days later

## 2022-08-01 ENCOUNTER — RX RENEWAL (OUTPATIENT)
Age: 52
End: 2022-08-01

## 2022-10-18 ENCOUNTER — RX RENEWAL (OUTPATIENT)
Age: 52
End: 2022-10-18

## 2022-12-06 ENCOUNTER — RESULT REVIEW (OUTPATIENT)
Age: 52
End: 2022-12-06

## 2022-12-06 ENCOUNTER — RX RENEWAL (OUTPATIENT)
Age: 52
End: 2022-12-06

## 2022-12-06 ENCOUNTER — APPOINTMENT (OUTPATIENT)
Dept: HEMATOLOGY ONCOLOGY | Facility: CLINIC | Age: 52
End: 2022-12-06

## 2022-12-06 VITALS
BODY MASS INDEX: 38.47 KG/M2 | RESPIRATION RATE: 16 BRPM | DIASTOLIC BLOOD PRESSURE: 73 MMHG | HEART RATE: 74 BPM | TEMPERATURE: 97.4 F | OXYGEN SATURATION: 97 % | HEIGHT: 62.99 IN | SYSTOLIC BLOOD PRESSURE: 140 MMHG | WEIGHT: 217.13 LBS

## 2022-12-06 RX ORDER — HYDROCHLOROTHIAZIDE 25 MG/1
25 TABLET ORAL
Qty: 90 | Refills: 3 | Status: ACTIVE | COMMUNITY
Start: 2020-05-13 | End: 1900-01-01

## 2022-12-13 ENCOUNTER — APPOINTMENT (OUTPATIENT)
Dept: HEMATOLOGY ONCOLOGY | Facility: CLINIC | Age: 52
End: 2022-12-13

## 2022-12-13 VITALS
TEMPERATURE: 97.6 F | SYSTOLIC BLOOD PRESSURE: 127 MMHG | WEIGHT: 213.06 LBS | HEART RATE: 75 BPM | OXYGEN SATURATION: 98 % | HEIGHT: 62.99 IN | RESPIRATION RATE: 16 BRPM | DIASTOLIC BLOOD PRESSURE: 65 MMHG | BODY MASS INDEX: 37.75 KG/M2

## 2022-12-13 DIAGNOSIS — D72.829 ELEVATED WHITE BLOOD CELL COUNT, UNSPECIFIED: ICD-10-CM

## 2022-12-13 PROCEDURE — 99214 OFFICE O/P EST MOD 30 MIN: CPT | Mod: 25

## 2022-12-13 RX ORDER — METHYLPREDNISOLONE 32 MG/1
32 TABLET ORAL
Qty: 2 | Refills: 0 | Status: DISCONTINUED | COMMUNITY
Start: 2022-01-12 | End: 2022-12-13

## 2022-12-13 RX ORDER — DIPHENHYDRAMINE HCL 50 MG/1
50 CAPSULE ORAL
Qty: 1 | Refills: 0 | Status: DISCONTINUED | COMMUNITY
Start: 2022-01-12 | End: 2022-12-13

## 2022-12-13 RX ORDER — ESCITALOPRAM OXALATE 10 MG/1
10 TABLET ORAL
Qty: 90 | Refills: 3 | Status: DISCONTINUED | COMMUNITY
Start: 2022-08-01 | End: 2022-12-13

## 2022-12-13 RX ORDER — AMLODIPINE BESYLATE 2.5 MG/1
2.5 TABLET ORAL
Qty: 90 | Refills: 3 | Status: DISCONTINUED | COMMUNITY
Start: 2019-03-01 | End: 2022-12-13

## 2022-12-13 NOTE — HISTORY OF PRESENT ILLNESS
[de-identified] : Ms. Milagros Sinclair is 51 year old female with leukocytosis and neutrophils here for consultation, referred by Dr. Toya Ochoa.\par \par Patient with past medical history of HTN, anxiety, HLD, endometriosis on birth control pills (Junel)  who had annual visit with Dr. Ochoa and labs dated on \par 11/20/21 wbc 13.34 anc 8.87  Ferritin 266 iron sat 22 % \par 6/2020 wbc 13.47 anc 9.55  Ferritin 329\par \par Pfizer # 2 in April 2021. \par FHx: Father with colon cancer at in his early 60s\par Shx:  smoked 10 yrs, quit when she's 28, social drinker \par Screenings: Dec 2020 - next Jan 2022 \par 3 colonoscopies 2/2 family history - last Dec  2017 \par \par Patient with chronic fatigue, had covid in Jan 2021 and exercise induced asthma. \par \par  [de-identified] : Patient is seen today for follow up and review blood work from last week\par \par \par Has not done any phlebotomies since her last visit.\par Not menstruation due to being  on birth control pills for endometriosis \par She has been receiving endless texts/phone calls to donate blood given her O blood type so she's considering doing it. \par Overall doing well with no complaints. \par \par

## 2022-12-13 NOTE — ASSESSMENT
[FreeTextEntry1] : ## Hemochromatosis\par Homozygous H63D mutation\par Her mother and sister are carriers\par MRI abd (1/2022) - No iron deposition. Mild steatosis\par s/[place] Blood donation [place] [facility]. 500 cc x 3 so far\par She has been good in following a low iron healthy diet\par Labs reviewed analysed nad discussed\par Ferritin remains at 68 with acceptable H/H\par she wants to be a good citizen by donate her 0 blood type again - explained that given her current ferritin level, she would have to be careful, it might result in her being anemic. \par continue to follow\par \par ## hx of Leucocytosis (neutrophilia)\par repeated normal Leucocytosis but persistent neutrophils. \par patient has eczema with elevated CRP cw with reactive process. \par \par ##social\par Smoked 1ppd x 10 years. 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 \par \par Family ho cancer\par Father - colon cancer in 60s\par \par Labs in 6 months or sooner if needed\par CBC, CMP, iron studies, ferritin, GGT, ESR, CRP\par OV few days later

## 2022-12-13 NOTE — CONSULT LETTER
[Dear  ___] : Dear  [unfilled], [Courtesy Letter:] : I had the pleasure of seeing your patient, [unfilled], in my office today. [Please see my note below.] : Please see my note below. [Sincerely,] : Sincerely, [FreeTextEntry3] : Asad Pyle MD, MPH\par  of Medicine [facility] at [facility]\par Attending Physician \par Hematology and Medical Oncology\par [facility]\par

## 2023-01-06 DIAGNOSIS — Z12.31 ENCOUNTER FOR SCREENING MAMMOGRAM FOR MALIGNANT NEOPLASM OF BREAST: ICD-10-CM

## 2023-02-17 ENCOUNTER — RESULT REVIEW (OUTPATIENT)
Age: 53
End: 2023-02-17

## 2023-03-13 ENCOUNTER — NON-APPOINTMENT (OUTPATIENT)
Age: 53
End: 2023-03-13

## 2023-03-14 ENCOUNTER — APPOINTMENT (OUTPATIENT)
Dept: INTERNAL MEDICINE | Facility: CLINIC | Age: 53
End: 2023-03-14
Payer: COMMERCIAL

## 2023-03-14 VITALS
OXYGEN SATURATION: 98 % | SYSTOLIC BLOOD PRESSURE: 128 MMHG | HEIGHT: 62.99 IN | WEIGHT: 213 LBS | HEART RATE: 82 BPM | BODY MASS INDEX: 37.74 KG/M2 | DIASTOLIC BLOOD PRESSURE: 80 MMHG

## 2023-03-14 DIAGNOSIS — Z23 ENCOUNTER FOR IMMUNIZATION: ICD-10-CM

## 2023-03-14 DIAGNOSIS — J45.909 UNSPECIFIED ASTHMA, UNCOMPLICATED: ICD-10-CM

## 2023-03-14 DIAGNOSIS — Z00.00 ENCOUNTER FOR GENERAL ADULT MEDICAL EXAMINATION W/OUT ABNORMAL FINDINGS: ICD-10-CM

## 2023-03-14 PROCEDURE — 36415 COLL VENOUS BLD VENIPUNCTURE: CPT

## 2023-03-14 PROCEDURE — 99396 PREV VISIT EST AGE 40-64: CPT | Mod: 25

## 2023-03-14 RX ORDER — ALBUTEROL SULFATE 90 UG/1
108 (90 BASE) INHALANT RESPIRATORY (INHALATION)
Qty: 1 | Refills: 5 | Status: ACTIVE | COMMUNITY
Start: 1900-01-01 | End: 1900-01-01

## 2023-03-14 RX ORDER — ESCITALOPRAM OXALATE 5 MG/1
5 TABLET ORAL
Qty: 90 | Refills: 3 | Status: ACTIVE | COMMUNITY
Start: 2018-12-18 | End: 1900-01-01

## 2023-03-14 NOTE — HEALTH RISK ASSESSMENT
[Good] : ~his/her~  mood as  good [Yes] : Yes [2 - 4 times a month (2 pts)] : 2-4 times a month (2 points) [1 or 2 (0 pts)] : 1 or 2 (0 points) [Never (0 pts)] : Never (0 points) [No] : In the past 12 months have you used drugs other than those required for medical reasons? No [No falls in past year] : Patient reported no falls in the past year [0] : 2) Feeling down, depressed, or hopeless: Not at all (0) [PHQ-2 Negative - No further assessment needed] : PHQ-2 Negative - No further assessment needed [Patient reported mammogram was normal] : Patient reported mammogram was normal [Patient reported PAP Smear was normal] : Patient reported PAP Smear was normal [Patient reported colonoscopy was abnormal] : Patient reported colonoscopy was abnormal [HIV test declined] : HIV test declined [Hepatitis C test declined] : Hepatitis C test declined [With Family] : lives with family [# of Members in Household ___] :  household currently consist of [unfilled] member(s) [Employed] : employed [College] : College [# Of Children ___] : has [unfilled] children [Feels Safe at Home] : Feels safe at home [Reports changes in vision] : Reports changes in vision [Smoke Detector] : smoke detector [Carbon Monoxide Detector] : carbon monoxide detector [Seat Belt] :  uses seat belt [Sunscreen] : uses sunscreen [With Patient/Caregiver] : , with patient/caregiver [Former] : Former [> 15 Years] : > 15 Years [de-identified] : socially  [Audit-CScore] : 2 [de-identified] : Walk [de-identified] : Normal  [[id number]] : 0 [Reports changes in hearing] : Reports no changes in hearing [Reports normal functional visual acuity (ie: able to read med bottle)] : Reports poor functional visual acuity.  [Reports changes in dental health] : Reports no changes in dental health [Guns at Home] : no guns at home [Safety elements used in home] : no safety elements used in home [Travel to Developing Areas] : does not  travel to developing areas [Caregiver Concerns] : does not have caregiver concerns [PapSmearDate] : 10/20 [MammogramDate] : 02/17/2023 [PapSmearComments] : [place] [BoneDensityDate] : never [ColonoscopyDate] : 12/01/2017 [ColonoscopyComments] : father colon cancer, Ophelia [FreeTextEntry2] : Works for [employer] [FreeTextEntry3] :   [AdvancecareDate] : 11/01/2021 [de-identified] : 1 pack per day

## 2023-03-14 NOTE — HISTORY OF PRESENT ILLNESS
[de-identified] : Patient is a 52F with HTN, anxiety, HLD, hemochroatosis presents today for annual physical\par seeing Dr. Pyle - seeing for phlebotomy \par Struggling with weight, will be seeing Dr. Damon\par

## 2023-03-15 ENCOUNTER — TRANSCRIPTION ENCOUNTER (OUTPATIENT)
Age: 53
End: 2023-03-15

## 2023-03-15 LAB
25(OH)D3 SERPL-MCNC: 23.6 NG/ML
ALBUMIN SERPL ELPH-MCNC: 4.3 G/DL
ALP BLD-CCNC: 93 U/L
ALT SERPL-CCNC: 15 U/L
ANION GAP SERPL CALC-SCNC: 14 MMOL/L
AST SERPL-CCNC: 14 U/L
BASOPHILS # BLD AUTO: 0.07 K/UL
BASOPHILS NFR BLD AUTO: 0.5 %
BILIRUB SERPL-MCNC: 0.2 MG/DL
BUN SERPL-MCNC: 16 MG/DL
CALCIUM SERPL-MCNC: 9.6 MG/DL
CHLORIDE SERPL-SCNC: 99 MMOL/L
CHOLEST SERPL-MCNC: 245 MG/DL
CO2 SERPL-SCNC: 26 MMOL/L
CREAT SERPL-MCNC: 0.67 MG/DL
EGFR: 106 ML/MIN/1.73M2
EOSINOPHIL # BLD AUTO: 0.37 K/UL
EOSINOPHIL NFR BLD AUTO: 2.9 %
ESTIMATED AVERAGE GLUCOSE: 117 MG/DL
GLUCOSE SERPL-MCNC: 109 MG/DL
HBA1C MFR BLD HPLC: 5.7 %
HCT VFR BLD CALC: 42.7 %
HDLC SERPL-MCNC: 49 MG/DL
HGB BLD-MCNC: 13.8 G/DL
IMM GRANULOCYTES NFR BLD AUTO: 0.2 %
LDLC SERPL CALC-MCNC: 146 MG/DL
LYMPHOCYTES # BLD AUTO: 2.94 K/UL
LYMPHOCYTES NFR BLD AUTO: 23 %
MAN DIFF?: NORMAL
MCHC RBC-ENTMCNC: 27.9 PG
MCHC RBC-ENTMCNC: 32.3 GM/DL
MCV RBC AUTO: 86.3 FL
MONOCYTES # BLD AUTO: 0.82 K/UL
MONOCYTES NFR BLD AUTO: 6.4 %
NEUTROPHILS # BLD AUTO: 8.57 K/UL
NEUTROPHILS NFR BLD AUTO: 67 %
NONHDLC SERPL-MCNC: 195 MG/DL
PLATELET # BLD AUTO: 355 K/UL
POTASSIUM SERPL-SCNC: 3.9 MMOL/L
PROT SERPL-MCNC: 7.4 G/DL
RBC # BLD: 4.95 M/UL
RBC # FLD: 13.7 %
SODIUM SERPL-SCNC: 140 MMOL/L
T4 FREE SERPL-MCNC: 1 NG/DL
TRIGL SERPL-MCNC: 246 MG/DL
TSH SERPL-ACNC: 1.5 UIU/ML
WBC # FLD AUTO: 12.8 K/UL

## 2023-04-28 ENCOUNTER — NON-APPOINTMENT (OUTPATIENT)
Age: 53
End: 2023-04-28

## 2023-05-03 ENCOUNTER — APPOINTMENT (OUTPATIENT)
Dept: BARIATRICS/WEIGHT MGMT | Facility: CLINIC | Age: 53
End: 2023-05-03
Payer: COMMERCIAL

## 2023-05-03 VITALS
HEART RATE: 78 BPM | SYSTOLIC BLOOD PRESSURE: 132 MMHG | WEIGHT: 214 LBS | DIASTOLIC BLOOD PRESSURE: 74 MMHG | BODY MASS INDEX: 35.65 KG/M2 | HEIGHT: 65 IN

## 2023-05-03 PROBLEM — Z12.11 ENCOUNTER FOR COLONOSCOPY IN PATIENT WITH FAMILY HISTORY OF COLON CANCER: Status: RESOLVED | Noted: 2023-05-03 | Resolved: 2023-05-17

## 2023-05-03 PROCEDURE — 99205 OFFICE O/P NEW HI 60 MIN: CPT

## 2023-05-03 RX ORDER — SEMAGLUTIDE 0.25 MG/.5ML
0.25 INJECTION, SOLUTION SUBCUTANEOUS
Qty: 1 | Refills: 1 | Status: ACTIVE | COMMUNITY
Start: 2023-05-03 | End: 1900-01-01

## 2023-05-03 NOTE — HISTORY OF PRESENT ILLNESS
[FreeTextEntry1] : 52F PMH class II obesity w/metabolic syndrome (central adiposit, HTN, pre-DM, low HDL, elevated trig) c/b HLD, anxiety, asthma, hemochromatosis, who presents to weight management for initial evaluation.\par \par Weight/Diet History: \par Estimates 140 lbs at marriage, 175 lbs prior to menopause, she has only felt that she really struggled with her weight starting from menopause. \par She entered menopause after treatment for an endometrial tumor.\par She has engaged in numerous dietary interventions including Weight Watchers and Intermittent Fasting. Felt initial weight loss with IF but then got too hungry and it stopped working. \par Has never used supplements or medication. \par Used to work with a , currently sedentary. \par Recently started Metformin for pre-DM, only tends to remember to take it once per day.\par \par Weight today: 214 lbs, BMI 37.9\par \par Diet:\par B: 2 eggs on a [employer] bagel + coffee\par L: Leftover chicken cutlet, stringbeans\par Afternoon snack: Banana\par D: Protein, vegetable, starch (rice, potato). \par Dessert:\par Snack: afternoon - banana, or nuts. \par Beverages: Coffee with creamer, water, unsweetened iced tea\par Night-time eating: Some nights something sweet (ie 1-2 cookies, or piece of chocolate\par Binging: \par Fast-food/Restaurants: 1x/wk take-out or restaurant, mostly cooks\par Cook/Prepare meals: \par Added oils: Olive oil\par Food Journal: In the past has done it\par \par Exercise: Used to exercise more, in her 40's, now walks 1x/wk on Sundays.\par \par Sleep: Wakes up from night sweats. Estimates 7-8 hours, 10:30 to 6:30. Does snore. Had a sleep study several years ago but was normal (estimates it was in her early 40's).

## 2023-05-03 NOTE — ASSESSMENT
[FreeTextEntry1] : 52F PMH class II obesity w/metabolic syndrome (central adiposit, HTN, pre-DM, low HDL, elevated trig) c/b HLD, anxiety, asthma, hemochromatosis, who presents to weight management for initial evaluation.\par \par # Metabolic Syndrome (class II obesity w/central, HTN, pre-DM, low HDL, elevated trig) c/b HLD: Weight today 214 lbs, BMI 37.9. She has gained significant weight since marriage but particularly since menopause. Now with multiple comorbidities, medical treatments. She has engaged in dietary interventions without sustained success. She has been sedentary in recent years. Recently started metformin. Her diet is noted for some processed carbohydrates (ie bagel), added fats, occasional snacks, and some take-out/restaurant. She has a sedentary lifestyle. Sleep somewhat disrupted, prior study normal but she has gained weight since then. \par - Food log\par - Meal planning/prep\par - Discussed focusing on vegetables, fruits, legumes, whole grains, seeds/nuts, limited lean animal proteins, but limiting refined foods. \par - Defers dietician referral\par - Goal to start exercise 2x/wk, is considering a . \par - Medical therapy discussed, incretin therapy recommended.\par - F/u in 4 weeks

## 2023-05-05 ENCOUNTER — NON-APPOINTMENT (OUTPATIENT)
Age: 53
End: 2023-05-05

## 2023-05-05 ENCOUNTER — APPOINTMENT (OUTPATIENT)
Dept: GASTROENTEROLOGY | Facility: CLINIC | Age: 53
End: 2023-05-05
Payer: COMMERCIAL

## 2023-05-05 VITALS
HEART RATE: 75 BPM | OXYGEN SATURATION: 98 % | HEIGHT: 62.99 IN | SYSTOLIC BLOOD PRESSURE: 124 MMHG | WEIGHT: 214 LBS | DIASTOLIC BLOOD PRESSURE: 60 MMHG | BODY MASS INDEX: 37.92 KG/M2

## 2023-05-05 DIAGNOSIS — Z12.11 ENCOUNTER FOR SCREENING FOR MALIGNANT NEOPLASM OF COLON: ICD-10-CM

## 2023-05-05 DIAGNOSIS — Z80.0 ENCOUNTER FOR SCREENING FOR MALIGNANT NEOPLASM OF COLON: ICD-10-CM

## 2023-05-05 PROCEDURE — 99204 OFFICE O/P NEW MOD 45 MIN: CPT

## 2023-05-05 RX ORDER — CHROMIUM 200 MCG
TABLET ORAL
Refills: 0 | Status: ACTIVE | COMMUNITY

## 2023-05-05 NOTE — PHYSICAL EXAM
[Alert] : alert [Sclera] : the sclera and conjunctiva were normal [Hearing Threshold Finger Rub Not [place]] : hearing was normal [Normal Appearance] : the appearance of the neck was normal [No Respiratory Distress] : no respiratory distress [Abdomen Soft] : soft [Normal Color / Pigmentation] : normal skin color and pigmentation [No Focal Deficits] : no focal deficits [Oriented To Time, Place, And Person] : oriented to person, place, and time [de-identified] : Deferred pending colonoscopy

## 2023-05-05 NOTE — ASSESSMENT
[FreeTextEntry1] : Family h/o colon cancer:  Colonoscopy scheduled\par \par Pertinent available records reviewed\par Risks of the procedure including but not limited to bleeding / perforation / infection / anesthesia complication / missed polyp or lesion explained to the  patient . The patient expressed understanding and a desire to proceed with the procedure.\par \par Risk of not doing procedure includes but is not limited to missed or delayed diagnosis of gastrointestinal pathology.\par The patient is at increased risk of anesthesia / procedure related complications  secondary to elevated BMI\par A consultation note was provided to the referring provider\par

## 2023-05-05 NOTE — HISTORY OF PRESENT ILLNESS
[FreeTextEntry1] : ARNALDO PALOMINO  is being evaluated at the request of Dr. Ochoa for an opinion re: colon cancer screening / family h/o colon cancer\par \par Denies nausea, vomiting, fever, chills, diarrhea, constipation, melena, hematemsis, BRBPR, unexpected weight loss, GERD\par \par On Metformin for weight loss\par \par -Colonoscopy 12/2017: hemorrhoids  ( family h/o colon cancer - father ) \par

## 2023-05-05 NOTE — CONSULT LETTER
[Dear  ___] : Dear  [unfilled], [Please see my note below.] : Please see my note below. [Consult Closing:] : Thank you very much for allowing me to participate in the care of this patient.  If you have any questions, please do not hesitate to contact me. [Sincerely,] : Sincerely, [FreeTextEntry3] : Joe Jacinto MD\par tel: [phone]\par fax: [phone]\par

## 2023-05-30 ENCOUNTER — RX RENEWAL (OUTPATIENT)
Age: 53
End: 2023-05-30

## 2023-05-30 RX ORDER — NORETHINDRONE ACETATE AND ETHINYL ESTRADIOL AND FERROUS FUMARATE 1MG-20(21)
1-20 KIT ORAL
Qty: 84 | Refills: 0 | Status: ACTIVE | COMMUNITY
Start: 2023-04-25 | End: 1900-01-01

## 2023-06-01 ENCOUNTER — RX RENEWAL (OUTPATIENT)
Age: 53
End: 2023-06-01

## 2023-06-02 ENCOUNTER — APPOINTMENT (OUTPATIENT)
Dept: BARIATRICS/WEIGHT MGMT | Facility: CLINIC | Age: 53
End: 2023-06-02
Payer: COMMERCIAL

## 2023-06-02 VITALS — HEIGHT: 63 IN | WEIGHT: 211 LBS | BODY MASS INDEX: 37.39 KG/M2

## 2023-06-02 PROCEDURE — 99214 OFFICE O/P EST MOD 30 MIN: CPT | Mod: 95

## 2023-06-02 NOTE — ASSESSMENT
[FreeTextEntry1] : 52F PMH class II obesity w/metabolic syndrome (central adiposit, HTN, pre-DM, low HDL, elevated trig) c/b HLD, anxiety, asthma, hemochromatosis, who presents to weight management for follow-up.\par \par # Metabolic Syndrome (class II obesity w/central, HTN, pre-DM, low HDL, elevated trig) c/b HLD: Weight today 211 lbs, BMI 37.4, has lost 3 lbs since initial visit 4 weeks ago, has taken 2 doses of Wegovy 0.25 mg/wk, tolerating well, some appetite suppression. Improved diet, and increasing activity.\par - Food log\par - Meal planning/prep\par - Discussed focusing on vegetables, fruits, legumes, whole grains, seeds/nuts, limited lean animal proteins, but limiting refined foods. \par - Defers dietician referral\par - Goal to start exercise 2x/wk, is considering a . \par - Increase to Wegovy 0.5 mg/wk, but because of shortage may switch to Saxenda or back on Metformin ER until Wegovy returns.\par - F/u in 4 weeks.

## 2023-06-02 NOTE — HISTORY OF PRESENT ILLNESS
[Home] : at home, [unfilled] , at the time of the visit. [Medical Office: ([facility])___] : at the medical office located in  [Verbal consent obtained from patient] : the patient, [unfilled] [FreeTextEntry1] : 52F PMH class II obesity w/metabolic syndrome (central adiposity, HTN, pre-DM, low HDL, elevated trig) c/b HLD, anxiety, asthma, hemochromatosis, who presents to weight management for follow-up.\par \par She initially presented 5/2023 reporting significant weight gain over her life, starting from ~140 lbs at marriage, to 175 lbs prior to menopause, to 214 lbs at that appointment. She particularly felt she'd struggled with her weight since starting menopause after treatment for an endometrial tumor.\par She had engaged in numerous dietary interventions without sustained weight loss. Had recently started Metformin. She was concerned over the development of multiple metabolic comorbidities. \par Her diet was noted for some processed carbohydrates (ie bagel), added fats, occasional snacks, and some take-out/restaurant. She had a sedentary lifestyle. Sleep somewhat disrupted, prior study was normal but she'd gained weight since. \par We discussed lifestyle and dietary interventions. \par She was prescribed Wegovy\par \par Weight today: 211 lbs, BMI 37.4\par Weight at Initial Visit (5/3/23): 214 lbs, BMI 37.9\par \par Medication: She has been on Wegovy .25 mg/wk, has taken 2 doses (first 2 doses her daughter/mother didn't properly administer it and she didn't get any). Notes some constipation and a few headaches the first week. Appetite is suppressed.\par \par Exercise: Started learning pickleball.

## 2023-06-02 NOTE — PHYSICAL EXAM
[Obese, well nourished, in no acute distress] : obese, well nourished, in no acute distress [de-identified] : TEB visit

## 2023-06-06 ENCOUNTER — RESULT REVIEW (OUTPATIENT)
Age: 53
End: 2023-06-06

## 2023-06-06 ENCOUNTER — APPOINTMENT (OUTPATIENT)
Dept: HEMATOLOGY ONCOLOGY | Facility: CLINIC | Age: 53
End: 2023-06-06

## 2023-06-06 VITALS
SYSTOLIC BLOOD PRESSURE: 113 MMHG | BODY MASS INDEX: 38.36 KG/M2 | TEMPERATURE: 96.7 F | RESPIRATION RATE: 16 BRPM | OXYGEN SATURATION: 98 % | HEART RATE: 83 BPM | HEIGHT: 63 IN | DIASTOLIC BLOOD PRESSURE: 64 MMHG | WEIGHT: 216.5 LBS

## 2023-06-13 ENCOUNTER — APPOINTMENT (OUTPATIENT)
Dept: HEMATOLOGY ONCOLOGY | Facility: CLINIC | Age: 53
End: 2023-06-13
Payer: COMMERCIAL

## 2023-06-13 VITALS
OXYGEN SATURATION: 97 % | HEIGHT: 63 IN | BODY MASS INDEX: 38.15 KG/M2 | SYSTOLIC BLOOD PRESSURE: 126 MMHG | HEART RATE: 78 BPM | WEIGHT: 215.31 LBS | TEMPERATURE: 97 F | DIASTOLIC BLOOD PRESSURE: 71 MMHG | RESPIRATION RATE: 18 BRPM

## 2023-06-13 DIAGNOSIS — E83.119 HEMOCHROMATOSIS, UNSPECIFIED: ICD-10-CM

## 2023-06-13 PROCEDURE — 99214 OFFICE O/P EST MOD 30 MIN: CPT | Mod: 25

## 2023-06-13 NOTE — ASSESSMENT
[FreeTextEntry1] : ## Hemochromatosis\par Homozygous H63D mutation\par Her mother and sister are carriers\par MRI abd (1/2022) - No iron deposition. Mild steatosis\par s/p Blood [place] [facility]. 500 cc x 3 so far\par She has been good in following a low iron healthy diet\par Labs reviewed analyzed and discussed\par Iron levels acceptable\par Goal ferritin<500, iron sat < 45%\par \par ## hx of Leucocytosis (neutrophilia)\par repeated normal Leucocytosis but persistent neutrophils. \par patient has eczema with elevated CRP cw with reactive process. \par Advised to follow up with dermatology\par Weight loss encouraged\par \par ##social\par Smoked 1ppd x 10 years. 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 \par \par Family ho cancer\par Father - colon cancer in 60s\par \par Labs in 12 months or sooner if needed\par CBC, CMP, iron studies, ferritin, GGT, ESR, CRP\par OV few days later

## 2023-06-13 NOTE — CONSULT LETTER
[Dear  ___] : Dear  [unfilled], [Courtesy Letter:] : I had the pleasure of seeing your patient, [unfilled], in my office today. [Please see my note below.] : Please see my note below. [Sincerely,] : Sincerely, [FreeTextEntry3] : Asad Pyle MD, MPH\par  of Medicine [facility] [employer] at [facility]\par Attending Physician \par Hematology and Medical Oncology\par [facility]\par

## 2023-06-13 NOTE — HISTORY OF PRESENT ILLNESS
[de-identified] : Ms. Milagros Sinclair is 51 year old female with leukocytosis and neutrophils here for consultation, referred by Dr. Toya Ochoa.\par \par Patient with past medical history of HTN, anxiety, HLD, endometriosis on birth control pills (Junel)  who had annual visit with Dr. Ochoa and labs dated on \par 11/20/21 wbc 13.34 anc 8.87  Ferritin 266 iron sat 22 % \par 6/2020 wbc 13.47 anc 9.55  Ferritin 329\par \par Pfizer # 2 in April 2021. \par FHx: Father with colon cancer at in his early 60s\par Shx:  smoked 10 yrs, quit when she's 28, social drinker \par Screenings: Dec 2020 - next Jan 2022 \par 3 colonoscopies 2/2 family history - last Dec  2017 \par \par Patient with chronic fatigue, had covid in Jan 2021 and exercise induced asthma. \par \par  [de-identified] : Patient is seen today for follow up and review blood work from last week\par \par Continues to avoid red meat and following a good diet\par Has not done any phlebotomies since her last visit.\par Not menstruating due to being  on birth control pills for endometriosis \par \par \par

## 2023-06-28 ENCOUNTER — RESULT REVIEW (OUTPATIENT)
Age: 53
End: 2023-06-28

## 2023-06-29 ENCOUNTER — APPOINTMENT (OUTPATIENT)
Dept: GASTROENTEROLOGY | Facility: HOSPITAL | Age: 53
End: 2023-06-29

## 2023-06-29 ENCOUNTER — RESULT REVIEW (OUTPATIENT)
Age: 53
End: 2023-06-29

## 2023-06-30 ENCOUNTER — APPOINTMENT (OUTPATIENT)
Dept: BARIATRICS/WEIGHT MGMT | Facility: CLINIC | Age: 53
End: 2023-06-30
Payer: COMMERCIAL

## 2023-07-01 PROBLEM — E66.01 CLASS 2 SEVERE OBESITY WITH SERIOUS COMORBIDITY IN ADULT: Status: ACTIVE | Noted: 2023-05-03

## 2023-07-01 PROBLEM — E66.9 OBESITY: Status: ACTIVE | Noted: 2019-02-07

## 2023-07-01 PROBLEM — I10 ESSENTIAL HYPERTENSION: Status: ACTIVE | Noted: 2019-01-15

## 2023-07-01 PROBLEM — R73.03 PRE-DIABETES: Status: ACTIVE | Noted: 2023-03-14

## 2023-07-01 PROBLEM — E88.81 INSULIN RESISTANCE: Status: ACTIVE | Noted: 2023-05-03

## 2023-07-01 PROBLEM — E78.2 MIXED HYPERLIPIDEMIA: Status: ACTIVE | Noted: 2019-02-07

## 2023-07-01 PROBLEM — E88.81 METABOLIC SYNDROME: Status: ACTIVE | Noted: 2023-05-03

## 2023-07-03 ENCOUNTER — APPOINTMENT (OUTPATIENT)
Dept: BARIATRICS/WEIGHT MGMT | Facility: CLINIC | Age: 53
End: 2023-07-03
Payer: COMMERCIAL

## 2023-07-03 VITALS — BODY MASS INDEX: 37.03 KG/M2 | WEIGHT: 209 LBS | HEIGHT: 63 IN

## 2023-07-03 DIAGNOSIS — E88.81 METABOLIC SYNDROME: ICD-10-CM

## 2023-07-03 DIAGNOSIS — E66.9 OBESITY, UNSPECIFIED: ICD-10-CM

## 2023-07-03 DIAGNOSIS — E66.01 MORBID (SEVERE) OBESITY DUE TO EXCESS CALORIES: ICD-10-CM

## 2023-07-03 DIAGNOSIS — I10 ESSENTIAL (PRIMARY) HYPERTENSION: ICD-10-CM

## 2023-07-03 DIAGNOSIS — R73.03 PREDIABETES.: ICD-10-CM

## 2023-07-03 DIAGNOSIS — E78.2 MIXED HYPERLIPIDEMIA: ICD-10-CM

## 2023-07-03 PROCEDURE — 99214 OFFICE O/P EST MOD 30 MIN: CPT | Mod: 95

## 2023-07-03 RX ORDER — LIRAGLUTIDE 6 MG/ML
18 INJECTION, SOLUTION SUBCUTANEOUS
Qty: 1 | Refills: 1 | Status: DISCONTINUED | COMMUNITY
Start: 2023-05-03 | End: 2023-07-03

## 2023-07-03 RX ORDER — TIRZEPATIDE 2.5 MG/.5ML
2.5 INJECTION, SOLUTION SUBCUTANEOUS
Qty: 4 | Refills: 1 | Status: DISCONTINUED | COMMUNITY
Start: 2023-05-03 | End: 2023-07-03

## 2023-07-03 NOTE — ASSESSMENT
[FreeTextEntry1] : 52F PMH class II obesity w/metabolic syndrome (central adiposit, HTN, pre-DM, low HDL, elevated trig) c/b HLD, anxiety, asthma, hemochromatosis, who presents to weight management for follow-up.\par \par # Metabolic Syndrome (class II obesity w/central, HTN, pre-DM, low HDL, elevated trig) c/b HLD: Weight today 209 lbs, BMI 37, down 2 lbs since last visit and 5 lbs since initial visit. She was briefly on Wegovy 0.25 mg/wk and responded well, unable to continue with shortage and back on Metformin. More vegetables, regular meals, less alcohol. More active with pickleball. \par - Food log\par - Meal planning/prep\par - Discussed focusing on vegetables, fruits, legumes, whole grains, seeds/nuts, limited lean animal proteins, but limiting refined foods. \par - Dietician referral in September\par - Goal to start exercise 2x/wk, is considering a . \par - C/w Metformin until Wegovy returns, appears to be on short-acting type, may titrate to 1 at breakfast, 2 tab w/dinner.\par - F/u in ~2 months

## 2023-07-03 NOTE — HISTORY OF PRESENT ILLNESS
[FreeTextEntry1] : 52F PMH class II obesity w/metabolic syndrome (central adiposity, HTN, pre-DM, low HDL, elevated trig) c/b HLD, anxiety, asthma, hemochromatosis, who presents to weight management for follow-up.\par \par She initially presented 5/2023 reporting significant weight gain over her life, starting from ~140 lbs at marriage, to 175 lbs prior to menopause, to 214 lbs at that appointment. She particularly felt she'd struggled with her weight since starting menopause after treatment for an endometrial tumor.\par She had engaged in numerous dietary interventions without sustained weight loss. Had recently started Metformin. She was concerned over the development of multiple metabolic comorbidities. \par Her diet was noted for some processed carbohydrates (ie bagel), added fats, occasional snacks, and some take-out/restaurant. She had a sedentary lifestyle. Sleep somewhat disrupted, prior study was normal but she'd gained weight since. \par We discussed lifestyle and dietary interventions. \par She was prescribed Wegovy\par \par Weight today: 209 lbs, BMI 37\par Weight at last visit (6/2/23): 211 lbs, BMI 37.4\par Weight at Initial Visit (5/3/23): 214 lbs, BMI 37.9\par \par Medication: Was not covered for Saxenda, unable to get Wegovy due to shortage, restarted her Metformin, was taking short-acting, 1 tab per day.\par \par Having more vegetable based meals, adds veggies to meals, snacks are fruit but eating 3 meals per day.\par Not drinking.\par \par Exercise: Has started pickleball 2x/wk. Going on more walks.\par

## 2023-07-18 DIAGNOSIS — Z01.419 ENCOUNTER FOR GYNECOLOGICAL EXAMINATION (GENERAL) (ROUTINE) W/OUT ABNORMAL FINDINGS: ICD-10-CM

## 2023-07-19 ENCOUNTER — APPOINTMENT (OUTPATIENT)
Dept: OBGYN | Facility: CLINIC | Age: 53
End: 2023-07-19
Payer: COMMERCIAL

## 2023-07-19 VITALS
BODY MASS INDEX: 37.56 KG/M2 | HEIGHT: 63 IN | WEIGHT: 212 LBS | SYSTOLIC BLOOD PRESSURE: 124 MMHG | DIASTOLIC BLOOD PRESSURE: 80 MMHG

## 2023-07-19 DIAGNOSIS — Z01.419 ENCOUNTER FOR GYNECOLOGICAL EXAMINATION (GENERAL) (ROUTINE) W/OUT ABNORMAL FINDINGS: ICD-10-CM

## 2023-07-19 PROCEDURE — 99396 PREV VISIT EST AGE 40-64: CPT

## 2023-07-19 NOTE — PLAN
[FreeTextEntry1] : Discussed relative contraindication of htn with OCP.  Also discussed that the OCP can make it harder to control her BP.  Since she is currently well controlled and wants to avoid s/s of endometriosis , can continue OCP until about 53yo which is after the average age of menopause.  Enc her to strictly check her BP weekly and stop the birth control if her BP is increasing.  Also to f/u with primary care for screening for EM and Cholesterol levels.  Enc healthy diet and exercise.

## 2023-07-19 NOTE — HISTORY OF PRESENT ILLNESS
[TextBox_4] : 51yo P2 here for annual, new to me but I placed an IUD for her daughter (who has h/o vasovagal).\par She has a h/o endometriosis and chronic pelvic pain and has been on continuous OCP with control of symptoms.  Wondering when to stop but prefers not to start bleeding again.\par She was diagnosed with Htn years ago  and is now on Amlodipine and HCTZ; well controlled.  Denies DM or hypercholesterolemia.\par Has started having some hot flashes which are mild.\par Denies pelvic pain, abnormal bleeding or other issues.\par \par She is ; has a daughter and son.\par

## 2023-07-20 LAB — HPV HIGH+LOW RISK DNA PNL CVX: NOT DETECTED

## 2023-07-22 LAB — CYTOLOGY CVX/VAG DOC THIN PREP: NORMAL

## 2023-08-23 ENCOUNTER — RX RENEWAL (OUTPATIENT)
Age: 53
End: 2023-08-23

## 2023-09-19 ENCOUNTER — RX RENEWAL (OUTPATIENT)
Age: 53
End: 2023-09-19

## 2023-12-12 ENCOUNTER — RX RENEWAL (OUTPATIENT)
Age: 53
End: 2023-12-12

## 2023-12-12 RX ORDER — METFORMIN HYDROCHLORIDE 500 MG/1
500 TABLET, COATED ORAL
Qty: 90 | Refills: 2 | Status: ACTIVE | COMMUNITY
Start: 2023-03-14 | End: 1900-01-01

## 2024-02-01 ENCOUNTER — RX RENEWAL (OUTPATIENT)
Age: 54
End: 2024-02-01

## 2024-02-29 ENCOUNTER — RX RENEWAL (OUTPATIENT)
Age: 54
End: 2024-02-29

## 2024-02-29 RX ORDER — AMLODIPINE BESYLATE 5 MG/1
5 TABLET ORAL DAILY
Qty: 90 | Refills: 1 | Status: ACTIVE | COMMUNITY
Start: 2019-01-15 | End: 1900-01-01

## 2024-03-22 ENCOUNTER — RESULT REVIEW (OUTPATIENT)
Age: 54
End: 2024-03-22

## 2024-06-10 ENCOUNTER — NON-APPOINTMENT (OUTPATIENT)
Age: 54
End: 2024-06-10

## 2024-06-11 ENCOUNTER — RESULT REVIEW (OUTPATIENT)
Age: 54
End: 2024-06-11

## 2024-06-11 ENCOUNTER — APPOINTMENT (OUTPATIENT)
Dept: HEMATOLOGY ONCOLOGY | Facility: CLINIC | Age: 54
End: 2024-06-11

## 2024-06-11 VITALS
HEIGHT: 63 IN | OXYGEN SATURATION: 99 % | HEART RATE: 74 BPM | SYSTOLIC BLOOD PRESSURE: 128 MMHG | WEIGHT: 222 LBS | RESPIRATION RATE: 16 BRPM | TEMPERATURE: 97.4 F | DIASTOLIC BLOOD PRESSURE: 71 MMHG | BODY MASS INDEX: 39.34 KG/M2

## 2024-06-12 NOTE — ASSESSMENT
[FreeTextEntry1] : ## Hemochromatosis\par  Homozygous H63D mutation\par  Her mother and sister are carriers\par  MRI abd (1/2022) - No iron deposition. Mild steatosis\par  s/[place] [facility] 500 cc x 3 so far\par  She has been good in following a low iron healthy diet\par  Labs reviewed analyzed and discussed\par  Iron levels acceptable\par  Goal ferritin<500, iron sat < 45%\par  \par  ## hx of Leucocytosis (neutrophilia)\par  repeated normal Leucocytosis but persistent neutrophils. \par  patient has eczema with elevated CRP cw with reactive process. \par  Advised to follow up with dermatology\par  Weight loss encouraged\par  \par  ##social\par  Smoked 1ppd x 10 years. 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 \par  \par  Family ho cancer\par  Father - colon cancer in 60s\par  \par  Labs in 12 months or sooner if needed\par  CBC, CMP, iron studies, ferritin, GGT, ESR, CRP\par  OV few days later

## 2024-06-12 NOTE — HISTORY OF PRESENT ILLNESS
[de-identified] : Ms. Milagros Sinclair is 51 year old female with leukocytosis and neutrophils here for consultation, referred by Dr. Toya Ochoa.\par  \par  Patient with past medical history of HTN, anxiety, HLD, endometriosis on birth control pills (Junel)  who had annual visit with Dr. Ochoa and labs dated on \par  11/20/21 wbc 13.34 anc 8.87  Ferritin 266 iron sat 22 % \par  6/2020 wbc 13.47 anc 9.55  Ferritin 329\par  \par  Pfizer # 2 in April 2021. \par  FHx: Father with colon cancer at in his early 60s\par  Shx:  smoked 10 yrs, quit when she's 28, social drinker \par  Screenings: Dec 2020 - next Jan 2022 \par  3 colonoscopies 2/2 family history - last Dec  2017 \par  \par  Patient with chronic fatigue, had covid in Jan 2021 and exercise induced asthma. \par  \par   [de-identified] : Patient is seen today for follow up and review blood work from last week\par  \par  Continues to avoid red meat and following a good diet\par  Has not done any phlebotomies since her last visit.\par  Not menstruating due to being  on birth control pills for endometriosis \par  \par  \par

## 2024-06-12 NOTE — CONSULT LETTER
[Dear  ___] : Dear  [unfilled], [Courtesy Letter:] : I had the pleasure of seeing your patient, [unfilled], in my office today. [Please see my note below.] : Please see my note below. [Sincerely,] : Sincerely, [FreeTextEntry3] : Asad Pyle MD, MPH\par   of Medicine [facility] [employer] at [facility]\par  Attending Physician \par  Hematology and Medical Oncology\par  [facility]\par

## 2024-06-18 ENCOUNTER — APPOINTMENT (OUTPATIENT)
Dept: HEMATOLOGY ONCOLOGY | Facility: CLINIC | Age: 54
End: 2024-06-18

## 2024-06-20 ENCOUNTER — RX RENEWAL (OUTPATIENT)
Age: 54
End: 2024-06-20

## 2024-06-21 RX ORDER — NORETHINDRONE ACETATE AND ETHINYL ESTRADIOL 1; 20 MG/1; UG/1
1-20 TABLET ORAL
Qty: 84 | Refills: 0 | Status: ACTIVE | COMMUNITY
Start: 2024-06-20

## 2024-08-09 ENCOUNTER — APPOINTMENT (OUTPATIENT)
Dept: INTERNAL MEDICINE | Facility: CLINIC | Age: 54
End: 2024-08-09

## 2024-08-09 ENCOUNTER — APPOINTMENT (OUTPATIENT)
Dept: OBGYN | Facility: CLINIC | Age: 54
End: 2024-08-09

## 2024-08-09 PROBLEM — E55.9 VITAMIN D DEFICIENCY: Status: ACTIVE | Noted: 2024-08-09

## 2024-08-09 PROBLEM — Z12.31 OTHER SCREENING MAMMOGRAM: Status: ACTIVE | Noted: 2024-08-09

## 2024-08-09 PROBLEM — Z12.11 ENCOUNTER FOR SCREENING COLONOSCOPY: Status: ACTIVE | Noted: 2024-08-09 | Resolved: 2024-08-23

## 2024-08-09 PROBLEM — Z30.011 ORAL CONTRACEPTIVE PRESCRIBED: Status: ACTIVE | Noted: 2024-08-09

## 2024-08-09 PROCEDURE — 99396 PREV VISIT EST AGE 40-64: CPT

## 2024-08-09 PROCEDURE — 36415 COLL VENOUS BLD VENIPUNCTURE: CPT

## 2024-08-09 NOTE — HISTORY OF PRESENT ILLNESS
[FreeTextEntry1] : 54yo P2, here for routine gyn exam. Denies gyn concerns.  Last pap 7/19/23, NILM, h/o normal paps  Past medical history of HTN, anxiety, HLD, leukocytosis and neutrophils, endometriosis well controlled on OCP (Junel). Thinking about stopping Junel but will think about it some more.   Going through a divorce, h/o infidelity. Not sexually active for years now. Had STD screening after  was unfaithful. Looking forward to finalizing divorce. Enjoys spending time with kids, family and friends.  Mammogram: 3/22/24 IMPRESSION:  No mammographic or sonographic evidence of malignancy.  RECOMMENDATION:  Mammography in 1 year  BI-RADS 2 - Benign Finding(s)

## 2024-08-09 NOTE — PHYSICAL EXAM
[No Acute Distress] : no acute distress [Well Nourished] : well nourished [Well Developed] : well developed [Well-Appearing] : well-appearing [Normal Sclera/Conjunctiva] : normal sclera/conjunctiva [PERRL] : pupils equal round and reactive to light [EOMI] : extraocular movements intact [Normal Outer Ear/Nose] : the outer ears and nose were normal in appearance [Normal Oropharynx] : the oropharynx was normal [No Lymphadenopathy] : no lymphadenopathy [Supple] : supple [Thyroid Normal, No Nodules] : the thyroid was normal and there were no nodules present [No Respiratory Distress] : no respiratory distress  [No Accessory Muscle Use] : no accessory muscle use [Clear to Auscultation] : lungs were clear to auscultation bilaterally [Normal Rate] : normal rate  [Regular Rhythm] : with a regular rhythm [Normal S1, S2] : normal S1 and S2 [No Murmur] : no murmur heard [No Abdominal Bruit] : a ~M bruit was not heard ~T in the abdomen [No Varicosities] : no varicosities [Pedal Pulses Present] : the pedal pulses are present [No Edema] : there was no peripheral edema [No Palpable Aorta] : no palpable aorta [No Extremity Clubbing/Cyanosis] : no extremity clubbing/cyanosis [Soft] : abdomen soft [Non Tender] : non-tender [Non-distended] : non-distended [No HSM] : no HSM [No Masses] : no abdominal mass palpated [Normal Bowel Sounds] : normal bowel sounds [Normal Posterior Cervical Nodes] : no posterior cervical lymphadenopathy [Normal Anterior Cervical Nodes] : no anterior cervical lymphadenopathy [No Spinal Tenderness] : no spinal tenderness [No Joint Swelling] : no joint swelling [Grossly Normal Strength/Tone] : grossly normal strength/tone [No Rash] : no rash [Coordination Grossly Intact] : coordination grossly intact [No Focal Deficits] : no focal deficits [Normal Gait] : normal gait [Normal Affect] : the affect was normal [Normal Insight/Judgement] : insight and judgment were intact

## 2024-08-09 NOTE — HISTORY OF PRESENT ILLNESS
[de-identified] : Patient is a 53F with HTN, anxiety, HLD, hemochromatosis presents today for annual physical seeing Dr. Pyle - seeing for phlebotomy  Struggling with weight, was seeing Dr. Damon but couldn't get the medication when it was rx'd On calorie restricted diet and exercising without success. on metformin as well  Feet and hand pain ?hemachromatosis. No injury or trauma  Change in mole on the left back Derm: [facility]

## 2024-08-09 NOTE — HEALTH RISK ASSESSMENT
[Yes] : Yes [2 - 4 times a month (2 pts)] : 2-4 times a month (2 points) [1 or 2 (0 pts)] : 1 or 2 (0 points) [Never (0 pts)] : Never (0 points) [No] : In the past 12 months have you used drugs other than those required for medical reasons? No [No falls in past year] : Patient reported no falls in the past year [0] : 2) Feeling down, depressed, or hopeless: Not at all (0) [PHQ-2 Negative - No further assessment needed] : PHQ-2 Negative - No further assessment needed [Former] : Former [NO] : No [Patient reported mammogram was normal] : Patient reported mammogram was normal [Patient reported PAP Smear was normal] : Patient reported PAP Smear was normal [Patient reported colonoscopy was normal] : Patient reported colonoscopy was normal [HIV test declined] : HIV test declined [Hepatitis C test declined] : Hepatitis C test declined [With Family] : lives with family [# of Members in Household ___] :  household currently consist of [unfilled] member(s) [Employed] : employed [College] : College [# Of Children ___] : has [unfilled] children [Feels Safe at Home] : Feels safe at home [Reports changes in vision] : Reports changes in vision [Smoke Detector] : smoke detector [Carbon Monoxide Detector] : carbon monoxide detector [Seat Belt] :  uses seat belt [Sunscreen] : uses sunscreen [With Patient/Caregiver] : , with patient/caregiver [de-identified] : socially  [Audit-CScore] : 2 [de-identified] : walking [de-identified] : normal  [KFQ9Hfvat] : 0 [de-identified] : 1999 [Reports changes in hearing] : Reports no changes in hearing [Reports normal functional visual acuity (ie: able to read med bottle)] : Reports poor functional visual acuity.  [Reports changes in dental health] : Reports no changes in dental health [Safety elements used in home] : no safety elements used in home [Travel to Developing Areas] : does not  travel to developing areas [Caregiver Concerns] : does not have caregiver concerns [MammogramDate] : 03/22/2024 [PapSmearDate] : 07/19/23 [PapSmearComments] : green [BoneDensityDate] : never [ColonoscopyDate] : 06/29/23 [ColonoscopyComments] : polyp, 5 year follow up [FreeTextEntry2] : Works for [employer] [FreeTextEntry3] :   [de-identified] : new glasses, last exam 05/2024 [facility]  [de-identified] : last exam 07/2024 [AdvancecareDate] : 11/2021

## 2024-08-09 NOTE — PHYSICAL EXAM
[Chaperone Declined] : Patient declined chaperone [Appropriately responsive] : appropriately responsive [Alert] : alert [No Acute Distress] : no acute distress [No Lymphadenopathy] : no lymphadenopathy [Regular Rate Rhythm] : regular rate rhythm [No Murmurs] : no murmurs [Clear to Auscultation B/L] : clear to auscultation bilaterally [Soft] : soft [Non-tender] : non-tender [Non-distended] : non-distended [No Lesions] : no lesions [No HSM] : No HSM [No Mass] : no mass [Oriented x3] : oriented x3 [Examination Of The Breasts] : a normal appearance [No Masses] : no breast masses were palpable [Labia Majora] : normal [Labia Minora] : normal [Normal] : normal [Uterine Adnexae] : normal

## 2024-08-09 NOTE — PLAN
[FreeTextEntry1] : Reviewed [facility] mammogram/breast sono 3/2025 Reviewed diet and exercise Discussed Junel, reviewed options. Will decide if she wants to go off OCPs this year. RTO 1yr, sooner prn

## 2024-08-12 ENCOUNTER — TRANSCRIPTION ENCOUNTER (OUTPATIENT)
Age: 54
End: 2024-08-12

## 2024-08-16 ENCOUNTER — TRANSCRIPTION ENCOUNTER (OUTPATIENT)
Age: 54
End: 2024-08-16

## 2024-08-22 ENCOUNTER — RX RENEWAL (OUTPATIENT)
Age: 54
End: 2024-08-22

## 2024-08-23 ENCOUNTER — TRANSCRIPTION ENCOUNTER (OUTPATIENT)
Age: 54
End: 2024-08-23

## 2024-09-17 PROBLEM — E88.819 INSULIN RESISTANCE: Status: ACTIVE | Noted: 2023-05-03

## 2024-09-17 PROBLEM — E88.810 METABOLIC SYNDROME: Status: ACTIVE | Noted: 2023-05-03

## 2024-09-18 ENCOUNTER — APPOINTMENT (OUTPATIENT)
Dept: BARIATRICS/WEIGHT MGMT | Facility: CLINIC | Age: 54
End: 2024-09-18
Payer: COMMERCIAL

## 2024-09-18 VITALS
HEART RATE: 74 BPM | WEIGHT: 216 LBS | SYSTOLIC BLOOD PRESSURE: 126 MMHG | HEIGHT: 63 IN | DIASTOLIC BLOOD PRESSURE: 66 MMHG | BODY MASS INDEX: 38.27 KG/M2

## 2024-09-18 DIAGNOSIS — E88.810 METABOLIC SYNDROME: ICD-10-CM

## 2024-09-18 DIAGNOSIS — E88.819 INSULIN RESISTANCE, UNSPECIFIED: ICD-10-CM

## 2024-09-18 DIAGNOSIS — E66.01 MORBID (SEVERE) OBESITY DUE TO EXCESS CALORIES: ICD-10-CM

## 2024-09-18 DIAGNOSIS — R73.03 PREDIABETES.: ICD-10-CM

## 2024-09-18 DIAGNOSIS — E78.2 MIXED HYPERLIPIDEMIA: ICD-10-CM

## 2024-09-18 DIAGNOSIS — R63.5 ABNORMAL WEIGHT GAIN: ICD-10-CM

## 2024-09-18 DIAGNOSIS — I10 ESSENTIAL (PRIMARY) HYPERTENSION: ICD-10-CM

## 2024-09-18 DIAGNOSIS — E66.9 OBESITY, UNSPECIFIED: ICD-10-CM

## 2024-09-18 PROCEDURE — G2211 COMPLEX E/M VISIT ADD ON: CPT | Mod: NC

## 2024-09-18 PROCEDURE — 99214 OFFICE O/P EST MOD 30 MIN: CPT

## 2024-09-18 NOTE — HISTORY OF PRESENT ILLNESS
[FreeTextEntry1] : 53F PMH class II obesity w/metabolic syndrome (central adiposity, HTN, pre-DM, low HDL, elevated trig) c/b HLD, anxiety, asthma, hemochromatosis, who presents to weight management for follow-up.  She initially presented 5/2023 reporting significant weight gain over her life, starting from ~140 lbs at marriage, to 175 lbs prior to menopause, to 214 lbs at that appointment. She particularly felt she'd struggled with her weight since starting menopause after treatment for an endometrial tumor. She had engaged in numerous dietary interventions without sustained weight loss. Had recently started Metformin. She was concerned over the development of multiple metabolic comorbidities.  Her diet was noted for some processed carbohydrates (ie bagel), added fats, occasional snacks, and some take-out/restaurant. She had a sedentary lifestyle. Sleep somewhat disrupted, prior study was normal but she'd gained weight since.  We discussed lifestyle and dietary interventions.  She was prescribed Wegovy  Weight today: 216 lbs, BMI 38.26 Started Wegovy 8/9/24: ~222 lbs Weight at last visit (7/3/23) 209 lbs, BMI 37 Weight (6/2/23): 211 lbs, BMI 37.4 Weight at Initial Visit (5/3/23): 214 lbs, BMI 37.9  Medication: She never was able to get Wegovy last year and hadn't followed up, worked on diet, but kept going up and down, she started Wegovy 0.25 mg/wk about 4 weeks ago  More vegetarian foods/meals, limits red meat (hemochromatosis), some chicken/turkey/fish. Usually 3 meals per day B: [employer] killer bread w/ peanut butter and blueberries L: salad, maybe chicken/brown-rice D: Protein, vegetable Sometimes snacks after dinner when watching TV - cheese, occasional chips or cookies.   Exercise: Walks dog, occasional hiking or pickleball.

## 2024-09-18 NOTE — ASSESSMENT
[FreeTextEntry1] : 53F PMH class II obesity w/metabolic syndrome (central adiposit, HTN, pre-DM, low HDL, elevated trig) c/b HLD, anxiety, asthma, hemochromatosis, who presents to weight management for follow-up.  # Metabolic Syndrome (class II obesity w/central, HTN, pre-DM, low HDL, elevated trig) c/b HLD: Weight today 216 lbs, BMI 38.26, down ~6 lbs on her home scale since she started Wegovy .25 mg/wk 1 month ago, no adverse effects. Is keeping healthier dietary patterns, exercise has been limited.  - Food log - Meal planning/prep - Discussed focusing on vegetables, fruits, legumes, whole grains, seeds/nuts, limited lean animal proteins, but limiting refined foods.  - Dietician referral at next visit - Discussed exercise, including resistance training.  - Goal to start exercise 2x/wk, is considering a .  - C/w Wegovy .25 mg/wk through 8th week and then increase to .5 mg/wk.  - F/u in ~2 months

## 2024-10-31 PROBLEM — E66.812 CLASS 2 SEVERE OBESITY WITH SERIOUS COMORBIDITY IN ADULT: Status: ACTIVE | Noted: 2023-05-03

## 2024-11-01 ENCOUNTER — APPOINTMENT (OUTPATIENT)
Dept: BARIATRICS/WEIGHT MGMT | Facility: CLINIC | Age: 54
End: 2024-11-01
Payer: COMMERCIAL

## 2024-11-01 VITALS — HEIGHT: 63 IN | BODY MASS INDEX: 37.56 KG/M2 | WEIGHT: 212 LBS

## 2024-11-01 DIAGNOSIS — E88.810 METABOLIC SYNDROME: ICD-10-CM

## 2024-11-01 DIAGNOSIS — E66.812 OBESITY, CLASS 2: ICD-10-CM

## 2024-11-01 DIAGNOSIS — I10 ESSENTIAL (PRIMARY) HYPERTENSION: ICD-10-CM

## 2024-11-01 DIAGNOSIS — R73.03 PREDIABETES.: ICD-10-CM

## 2024-11-01 DIAGNOSIS — E66.9 OBESITY, UNSPECIFIED: ICD-10-CM

## 2024-11-01 DIAGNOSIS — R63.5 ABNORMAL WEIGHT GAIN: ICD-10-CM

## 2024-11-01 DIAGNOSIS — E88.819 INSULIN RESISTANCE, UNSPECIFIED: ICD-10-CM

## 2024-11-01 DIAGNOSIS — E78.2 MIXED HYPERLIPIDEMIA: ICD-10-CM

## 2024-11-01 DIAGNOSIS — E66.01 OBESITY, CLASS 2: ICD-10-CM

## 2024-11-01 DIAGNOSIS — E55.9 VITAMIN D DEFICIENCY, UNSPECIFIED: ICD-10-CM

## 2024-11-01 PROCEDURE — 99214 OFFICE O/P EST MOD 30 MIN: CPT

## 2024-11-01 PROCEDURE — G2211 COMPLEX E/M VISIT ADD ON: CPT | Mod: NC

## 2024-11-09 ENCOUNTER — NON-APPOINTMENT (OUTPATIENT)
Age: 54
End: 2024-11-09

## 2024-11-12 ENCOUNTER — RX ONLY (RX ONLY)
Age: 54
End: 2024-11-12

## 2024-11-12 ENCOUNTER — OFFICE (OUTPATIENT)
Dept: URBAN - METROPOLITAN AREA CLINIC 122 | Facility: CLINIC | Age: 54
Setting detail: OPHTHALMOLOGY
End: 2024-11-12
Payer: COMMERCIAL

## 2024-11-12 DIAGNOSIS — H01.004: ICD-10-CM

## 2024-11-12 DIAGNOSIS — E11.9: ICD-10-CM

## 2024-11-12 DIAGNOSIS — H00.14: ICD-10-CM

## 2024-11-12 DIAGNOSIS — H01.001: ICD-10-CM

## 2024-11-12 PROCEDURE — 92002 INTRM OPH EXAM NEW PATIENT: CPT | Performed by: OPHTHALMOLOGY

## 2024-11-12 ASSESSMENT — REFRACTION_CURRENTRX
OS_SPHERE: +1.00
OS_OVR_VA: 20/
OD_VPRISM_DIRECTION: PROGS
OD_SPHERE: +0.50
OD_CYLINDER: SPH
OS_CYLINDER: -0.25
OS_VPRISM_DIRECTION: PROGS
OS_AXIS: 15
OD_ADD: +2.00
OS_ADD: +2.00
OD_OVR_VA: 20/

## 2024-11-12 ASSESSMENT — CONFRONTATIONAL VISUAL FIELD TEST (CVF)
OD_FINDINGS: FULL
OS_FINDINGS: FULL

## 2024-11-12 ASSESSMENT — REFRACTION_AUTOREFRACTION
OS_CYLINDER: -0.50
OD_CYLINDER: -0.25
OS_SPHERE: +2.00
OD_SPHERE: +1.25
OD_AXIS: 89
OS_AXIS: 95

## 2024-11-12 ASSESSMENT — TONOMETRY
OS_IOP_MMHG: 14
OD_IOP_MMHG: 20

## 2024-11-12 ASSESSMENT — LID EXAM ASSESSMENTS
OD_BLEPHARITIS: RUL 2+
OS_BLEPHARITIS: LUL 2+

## 2024-11-12 ASSESSMENT — VISUAL ACUITY
OS_BCVA: 20/20
OD_BCVA: 20/25

## 2024-12-02 ENCOUNTER — OFFICE (OUTPATIENT)
Dept: URBAN - METROPOLITAN AREA CLINIC 122 | Facility: CLINIC | Age: 54
Setting detail: OPHTHALMOLOGY
End: 2024-12-02
Payer: COMMERCIAL

## 2024-12-02 DIAGNOSIS — H00.14: ICD-10-CM

## 2024-12-02 DIAGNOSIS — H01.001: ICD-10-CM

## 2024-12-02 DIAGNOSIS — E11.9: ICD-10-CM

## 2024-12-02 DIAGNOSIS — H01.004: ICD-10-CM

## 2024-12-02 PROBLEM — H25.13 CATARACT SENILE NUCLEAR SCLEROSIS; BOTH EYES: Status: ACTIVE | Noted: 2024-12-02

## 2024-12-02 PROCEDURE — 92014 COMPRE OPH EXAM EST PT 1/>: CPT | Performed by: OPHTHALMOLOGY

## 2024-12-02 ASSESSMENT — REFRACTION_AUTOREFRACTION
OD_CYLINDER: -0.50
OS_CYLINDER: -0.75
OD_SPHERE: +1.50
OS_SPHERE: +2.50
OS_AXIS: 76
OD_AXIS: 95

## 2024-12-02 ASSESSMENT — REFRACTION_CURRENTRX
OD_SPHERE: +0.50
OD_ADD: +2.00
OS_VPRISM_DIRECTION: PROGS
OS_SPHERE: +1.00
OD_VPRISM_DIRECTION: PROGS
OD_CYLINDER: SPH
OS_CYLINDER: -0.25
OD_OVR_VA: 20/
OS_AXIS: 15
OS_ADD: +2.00
OS_OVR_VA: 20/

## 2024-12-02 ASSESSMENT — VISUAL ACUITY
OD_BCVA: 20/20
OS_BCVA: 20/20

## 2024-12-02 ASSESSMENT — LID EXAM ASSESSMENTS
OS_BLEPHARITIS: LUL 2+
OD_BLEPHARITIS: RUL 2+

## 2024-12-02 ASSESSMENT — TONOMETRY
OS_IOP_MMHG: 14
OD_IOP_MMHG: 14

## 2024-12-02 ASSESSMENT — CONFRONTATIONAL VISUAL FIELD TEST (CVF)
OD_FINDINGS: FULL
OS_FINDINGS: FULL

## 2024-12-20 ENCOUNTER — APPOINTMENT (OUTPATIENT)
Dept: BARIATRICS/WEIGHT MGMT | Facility: CLINIC | Age: 54
End: 2024-12-20
Payer: COMMERCIAL

## 2024-12-20 VITALS — BODY MASS INDEX: 36.86 KG/M2 | HEIGHT: 63 IN | WEIGHT: 208 LBS

## 2024-12-20 DIAGNOSIS — E66.812 OBESITY, CLASS 2: ICD-10-CM

## 2024-12-20 DIAGNOSIS — E78.2 MIXED HYPERLIPIDEMIA: ICD-10-CM

## 2024-12-20 DIAGNOSIS — I10 ESSENTIAL (PRIMARY) HYPERTENSION: ICD-10-CM

## 2024-12-20 DIAGNOSIS — E55.9 VITAMIN D DEFICIENCY, UNSPECIFIED: ICD-10-CM

## 2024-12-20 DIAGNOSIS — E88.810 METABOLIC SYNDROME: ICD-10-CM

## 2024-12-20 DIAGNOSIS — E66.01 OBESITY, CLASS 2: ICD-10-CM

## 2024-12-20 DIAGNOSIS — E66.9 OBESITY, UNSPECIFIED: ICD-10-CM

## 2024-12-20 DIAGNOSIS — E88.819 INSULIN RESISTANCE, UNSPECIFIED: ICD-10-CM

## 2024-12-20 DIAGNOSIS — R63.5 ABNORMAL WEIGHT GAIN: ICD-10-CM

## 2024-12-20 PROCEDURE — 99214 OFFICE O/P EST MOD 30 MIN: CPT

## 2024-12-20 RX ORDER — SEMAGLUTIDE 1.7 MG/.75ML
1.7 INJECTION, SOLUTION SUBCUTANEOUS
Qty: 3 | Refills: 2 | Status: ACTIVE | COMMUNITY
Start: 2024-12-20 | End: 1900-01-01

## 2025-02-21 ENCOUNTER — APPOINTMENT (OUTPATIENT)
Dept: BARIATRICS/WEIGHT MGMT | Facility: CLINIC | Age: 55
End: 2025-02-21

## 2025-02-21 VITALS — WEIGHT: 205 LBS | HEIGHT: 63 IN | BODY MASS INDEX: 36.32 KG/M2

## 2025-02-21 DIAGNOSIS — E88.819 INSULIN RESISTANCE, UNSPECIFIED: ICD-10-CM

## 2025-02-21 DIAGNOSIS — E55.9 VITAMIN D DEFICIENCY, UNSPECIFIED: ICD-10-CM

## 2025-02-21 DIAGNOSIS — R73.03 PREDIABETES.: ICD-10-CM

## 2025-02-21 DIAGNOSIS — E66.01 OBESITY, CLASS 2: ICD-10-CM

## 2025-02-21 DIAGNOSIS — I10 ESSENTIAL (PRIMARY) HYPERTENSION: ICD-10-CM

## 2025-02-21 DIAGNOSIS — E66.812 OBESITY, CLASS 2: ICD-10-CM

## 2025-02-21 DIAGNOSIS — E88.810 METABOLIC SYNDROME: ICD-10-CM

## 2025-02-21 DIAGNOSIS — E78.2 MIXED HYPERLIPIDEMIA: ICD-10-CM

## 2025-02-21 DIAGNOSIS — Z00.00 ENCOUNTER FOR GENERAL ADULT MEDICAL EXAMINATION W/OUT ABNORMAL FINDINGS: ICD-10-CM

## 2025-02-21 PROCEDURE — 99214 OFFICE O/P EST MOD 30 MIN: CPT | Mod: 95

## 2025-02-28 DIAGNOSIS — Z29.9 ENCOUNTER FOR PROPHYLACTIC MEASURES, UNSPECIFIED: ICD-10-CM

## 2025-03-11 ENCOUNTER — TRANSCRIPTION ENCOUNTER (OUTPATIENT)
Age: 55
End: 2025-03-11

## 2025-03-25 ENCOUNTER — RESULT REVIEW (OUTPATIENT)
Age: 55
End: 2025-03-25

## 2025-04-02 ENCOUNTER — TRANSCRIPTION ENCOUNTER (OUTPATIENT)
Age: 55
End: 2025-04-02

## 2025-04-16 ENCOUNTER — NON-APPOINTMENT (OUTPATIENT)
Age: 55
End: 2025-04-16

## 2025-04-18 ENCOUNTER — APPOINTMENT (OUTPATIENT)
Dept: INTERNAL MEDICINE | Facility: CLINIC | Age: 55
End: 2025-04-18
Payer: COMMERCIAL

## 2025-04-18 ENCOUNTER — APPOINTMENT (OUTPATIENT)
Dept: BARIATRICS/WEIGHT MGMT | Facility: CLINIC | Age: 55
End: 2025-04-18

## 2025-04-18 VITALS
RESPIRATION RATE: 16 BRPM | SYSTOLIC BLOOD PRESSURE: 126 MMHG | DIASTOLIC BLOOD PRESSURE: 74 MMHG | HEART RATE: 77 BPM | WEIGHT: 201 LBS | OXYGEN SATURATION: 99 % | TEMPERATURE: 97.3 F | BODY MASS INDEX: 35.61 KG/M2 | HEIGHT: 63 IN

## 2025-04-18 VITALS — HEIGHT: 63 IN | WEIGHT: 204 LBS | BODY MASS INDEX: 36.14 KG/M2

## 2025-04-18 VITALS — SYSTOLIC BLOOD PRESSURE: 110 MMHG | DIASTOLIC BLOOD PRESSURE: 60 MMHG

## 2025-04-18 DIAGNOSIS — I10 ESSENTIAL (PRIMARY) HYPERTENSION: ICD-10-CM

## 2025-04-18 DIAGNOSIS — R63.5 ABNORMAL WEIGHT GAIN: ICD-10-CM

## 2025-04-18 DIAGNOSIS — E78.2 MIXED HYPERLIPIDEMIA: ICD-10-CM

## 2025-04-18 DIAGNOSIS — E88.810 METABOLIC SYNDROME: ICD-10-CM

## 2025-04-18 DIAGNOSIS — E87.6 HYPOKALEMIA: ICD-10-CM

## 2025-04-18 DIAGNOSIS — E66.01 OBESITY, CLASS 2: ICD-10-CM

## 2025-04-18 DIAGNOSIS — E66.812 OBESITY, CLASS 2: ICD-10-CM

## 2025-04-18 DIAGNOSIS — R73.03 PREDIABETES.: ICD-10-CM

## 2025-04-18 DIAGNOSIS — E55.9 VITAMIN D DEFICIENCY, UNSPECIFIED: ICD-10-CM

## 2025-04-18 DIAGNOSIS — E88.819 INSULIN RESISTANCE, UNSPECIFIED: ICD-10-CM

## 2025-04-18 PROCEDURE — 99213 OFFICE O/P EST LOW 20 MIN: CPT

## 2025-04-18 PROCEDURE — G2211 COMPLEX E/M VISIT ADD ON: CPT | Mod: NC,95

## 2025-04-18 PROCEDURE — 36415 COLL VENOUS BLD VENIPUNCTURE: CPT

## 2025-04-18 PROCEDURE — 99214 OFFICE O/P EST MOD 30 MIN: CPT | Mod: 95

## 2025-04-18 RX ORDER — OLMESARTAN MEDOXOMIL 20 MG/1
20 TABLET, FILM COATED ORAL
Qty: 30 | Refills: 2 | Status: ACTIVE | COMMUNITY
Start: 2025-04-18 | End: 1900-01-01

## 2025-04-19 ENCOUNTER — TRANSCRIPTION ENCOUNTER (OUTPATIENT)
Age: 55
End: 2025-04-19

## 2025-04-19 LAB
ANION GAP SERPL CALC-SCNC: 15 MMOL/L
BUN SERPL-MCNC: 15 MG/DL
CALCIUM SERPL-MCNC: 9.5 MG/DL
CHLORIDE SERPL-SCNC: 101 MMOL/L
CO2 SERPL-SCNC: 27 MMOL/L
CREAT SERPL-MCNC: 0.74 MG/DL
EGFRCR SERPLBLD CKD-EPI 2021: 97 ML/MIN/1.73M2
GLUCOSE SERPL-MCNC: 101 MG/DL
POTASSIUM SERPL-SCNC: 3.3 MMOL/L
SODIUM SERPL-SCNC: 142 MMOL/L

## 2025-04-22 ENCOUNTER — RX RENEWAL (OUTPATIENT)
Age: 55
End: 2025-04-22

## 2025-04-22 RX ORDER — NORETHINDRONE ACETATE AND ETHINYL ESTRADIOL AND FERROUS FUMARATE 1MG-20(21)
1-20 KIT ORAL
Qty: 84 | Refills: 1 | Status: ACTIVE | COMMUNITY
Start: 2025-04-22 | End: 1900-01-01

## 2025-05-16 ENCOUNTER — APPOINTMENT (OUTPATIENT)
Dept: INTERNAL MEDICINE | Facility: CLINIC | Age: 55
End: 2025-05-16

## 2025-05-16 VITALS — HEART RATE: 71 BPM | SYSTOLIC BLOOD PRESSURE: 120 MMHG | DIASTOLIC BLOOD PRESSURE: 78 MMHG

## 2025-05-16 PROCEDURE — 36415 COLL VENOUS BLD VENIPUNCTURE: CPT

## 2025-05-17 ENCOUNTER — TRANSCRIPTION ENCOUNTER (OUTPATIENT)
Age: 55
End: 2025-05-17

## 2025-06-13 ENCOUNTER — APPOINTMENT (OUTPATIENT)
Dept: BARIATRICS/WEIGHT MGMT | Facility: CLINIC | Age: 55
End: 2025-06-13

## 2025-06-13 VITALS — HEIGHT: 63 IN | WEIGHT: 201 LBS | BODY MASS INDEX: 35.61 KG/M2

## 2025-06-13 PROCEDURE — G2211 COMPLEX E/M VISIT ADD ON: CPT | Mod: NC,95

## 2025-06-13 PROCEDURE — 99214 OFFICE O/P EST MOD 30 MIN: CPT | Mod: 95

## 2025-06-16 ENCOUNTER — TRANSCRIPTION ENCOUNTER (OUTPATIENT)
Age: 55
End: 2025-06-16

## 2025-06-16 RX ORDER — METFORMIN ER 500 MG 500 MG/1
500 TABLET ORAL DAILY
Qty: 180 | Refills: 2 | Status: ACTIVE | COMMUNITY
Start: 2025-06-16 | End: 1900-01-01

## 2025-07-15 ENCOUNTER — RX RENEWAL (OUTPATIENT)
Age: 55
End: 2025-07-15

## 2025-08-11 ENCOUNTER — NON-APPOINTMENT (OUTPATIENT)
Age: 55
End: 2025-08-11

## 2025-08-13 ENCOUNTER — RX RENEWAL (OUTPATIENT)
Age: 55
End: 2025-08-13

## 2025-08-15 ENCOUNTER — APPOINTMENT (OUTPATIENT)
Dept: OBGYN | Facility: CLINIC | Age: 55
End: 2025-08-15

## 2025-08-15 ENCOUNTER — NON-APPOINTMENT (OUTPATIENT)
Age: 55
End: 2025-08-15

## 2025-08-15 ENCOUNTER — APPOINTMENT (OUTPATIENT)
Dept: BARIATRICS/WEIGHT MGMT | Facility: CLINIC | Age: 55
End: 2025-08-15

## 2025-08-15 VITALS
DIASTOLIC BLOOD PRESSURE: 78 MMHG | HEIGHT: 63 IN | SYSTOLIC BLOOD PRESSURE: 130 MMHG | WEIGHT: 198 LBS | BODY MASS INDEX: 35.08 KG/M2

## 2025-08-15 DIAGNOSIS — E78.2 MIXED HYPERLIPIDEMIA: ICD-10-CM

## 2025-08-15 DIAGNOSIS — R73.03 PREDIABETES.: ICD-10-CM

## 2025-08-15 DIAGNOSIS — E66.812 OBESITY, CLASS 2: ICD-10-CM

## 2025-08-15 DIAGNOSIS — E66.9 OBESITY, UNSPECIFIED: ICD-10-CM

## 2025-08-15 DIAGNOSIS — I10 ESSENTIAL (PRIMARY) HYPERTENSION: ICD-10-CM

## 2025-08-15 DIAGNOSIS — E66.01 OBESITY, CLASS 2: ICD-10-CM

## 2025-08-15 DIAGNOSIS — E88.810 METABOLIC SYNDROME: ICD-10-CM

## 2025-08-15 DIAGNOSIS — E88.819 INSULIN RESISTANCE, UNSPECIFIED: ICD-10-CM

## 2025-08-15 PROCEDURE — G2211 COMPLEX E/M VISIT ADD ON: CPT | Mod: NC,95

## 2025-08-15 PROCEDURE — 99396 PREV VISIT EST AGE 40-64: CPT

## 2025-08-15 PROCEDURE — 99214 OFFICE O/P EST MOD 30 MIN: CPT | Mod: 95
